# Patient Record
Sex: MALE | Race: OTHER | HISPANIC OR LATINO | URBAN - METROPOLITAN AREA
[De-identification: names, ages, dates, MRNs, and addresses within clinical notes are randomized per-mention and may not be internally consistent; named-entity substitution may affect disease eponyms.]

---

## 2018-01-16 ENCOUNTER — EMERGENCY (EMERGENCY)
Facility: HOSPITAL | Age: 58
LOS: 1 days | Discharge: HOME | End: 2018-01-16
Attending: EMERGENCY MEDICINE
Payer: COMMERCIAL

## 2018-01-16 VITALS
OXYGEN SATURATION: 99 % | WEIGHT: 205.03 LBS | HEIGHT: 64 IN | HEART RATE: 62 BPM | DIASTOLIC BLOOD PRESSURE: 81 MMHG | RESPIRATION RATE: 18 BRPM | TEMPERATURE: 97 F | SYSTOLIC BLOOD PRESSURE: 136 MMHG

## 2018-01-16 PROCEDURE — 99285 EMERGENCY DEPT VISIT HI MDM: CPT | Mod: 25

## 2018-01-17 VITALS
DIASTOLIC BLOOD PRESSURE: 78 MMHG | RESPIRATION RATE: 18 BRPM | SYSTOLIC BLOOD PRESSURE: 132 MMHG | OXYGEN SATURATION: 98 % | HEART RATE: 72 BPM | TEMPERATURE: 98 F

## 2018-01-17 LAB
ALBUMIN SERPL ELPH-MCNC: 3.9 G/DL — SIGNIFICANT CHANGE UP (ref 3.5–5)
ALP SERPL-CCNC: 87 U/L — SIGNIFICANT CHANGE UP (ref 40–120)
ALT FLD-CCNC: 55 U/L DA — SIGNIFICANT CHANGE UP (ref 10–60)
ANION GAP SERPL CALC-SCNC: 8 MMOL/L — SIGNIFICANT CHANGE UP (ref 5–17)
APPEARANCE UR: CLEAR — SIGNIFICANT CHANGE UP
AST SERPL-CCNC: 26 U/L — SIGNIFICANT CHANGE UP (ref 10–40)
BASOPHILS # BLD AUTO: 0.1 K/UL — SIGNIFICANT CHANGE UP (ref 0–0.2)
BASOPHILS NFR BLD AUTO: 0.9 % — SIGNIFICANT CHANGE UP (ref 0–2)
BILIRUB SERPL-MCNC: 0.5 MG/DL — SIGNIFICANT CHANGE UP (ref 0.2–1.2)
BILIRUB UR-MCNC: NEGATIVE — SIGNIFICANT CHANGE UP
BUN SERPL-MCNC: 16 MG/DL — SIGNIFICANT CHANGE UP (ref 7–18)
CALCIUM SERPL-MCNC: 8.5 MG/DL — SIGNIFICANT CHANGE UP (ref 8.4–10.5)
CHLORIDE SERPL-SCNC: 104 MMOL/L — SIGNIFICANT CHANGE UP (ref 96–108)
CO2 SERPL-SCNC: 25 MMOL/L — SIGNIFICANT CHANGE UP (ref 22–31)
COLOR SPEC: YELLOW — SIGNIFICANT CHANGE UP
CREAT SERPL-MCNC: 0.88 MG/DL — SIGNIFICANT CHANGE UP (ref 0.5–1.3)
DIFF PNL FLD: NEGATIVE — SIGNIFICANT CHANGE UP
EOSINOPHIL # BLD AUTO: 0.2 K/UL — SIGNIFICANT CHANGE UP (ref 0–0.5)
EOSINOPHIL NFR BLD AUTO: 2.7 % — SIGNIFICANT CHANGE UP (ref 0–6)
GLUCOSE SERPL-MCNC: 166 MG/DL — HIGH (ref 70–99)
GLUCOSE UR QL: NEGATIVE — SIGNIFICANT CHANGE UP
HCT VFR BLD CALC: 44.1 % — SIGNIFICANT CHANGE UP (ref 39–50)
HGB BLD-MCNC: 14.1 G/DL — SIGNIFICANT CHANGE UP (ref 13–17)
KETONES UR-MCNC: NEGATIVE — SIGNIFICANT CHANGE UP
LEUKOCYTE ESTERASE UR-ACNC: NEGATIVE — SIGNIFICANT CHANGE UP
LYMPHOCYTES # BLD AUTO: 3.7 K/UL — HIGH (ref 1–3.3)
LYMPHOCYTES # BLD AUTO: 46.1 % — HIGH (ref 13–44)
MCHC RBC-ENTMCNC: 28.8 PG — SIGNIFICANT CHANGE UP (ref 27–34)
MCHC RBC-ENTMCNC: 32 GM/DL — SIGNIFICANT CHANGE UP (ref 32–36)
MCV RBC AUTO: 89.8 FL — SIGNIFICANT CHANGE UP (ref 80–100)
MONOCYTES # BLD AUTO: 0.6 K/UL — SIGNIFICANT CHANGE UP (ref 0–0.9)
MONOCYTES NFR BLD AUTO: 7.1 % — SIGNIFICANT CHANGE UP (ref 2–14)
NEUTROPHILS # BLD AUTO: 3.5 K/UL — SIGNIFICANT CHANGE UP (ref 1.8–7.4)
NEUTROPHILS NFR BLD AUTO: 43.3 % — SIGNIFICANT CHANGE UP (ref 43–77)
NITRITE UR-MCNC: NEGATIVE — SIGNIFICANT CHANGE UP
PH UR: 5 — SIGNIFICANT CHANGE UP (ref 5–8)
PLATELET # BLD AUTO: 200 K/UL — SIGNIFICANT CHANGE UP (ref 150–400)
POTASSIUM SERPL-MCNC: 4 MMOL/L — SIGNIFICANT CHANGE UP (ref 3.5–5.3)
POTASSIUM SERPL-SCNC: 4 MMOL/L — SIGNIFICANT CHANGE UP (ref 3.5–5.3)
PROT SERPL-MCNC: 7.6 G/DL — SIGNIFICANT CHANGE UP (ref 6–8.3)
PROT UR-MCNC: NEGATIVE — SIGNIFICANT CHANGE UP
RBC # BLD: 4.91 M/UL — SIGNIFICANT CHANGE UP (ref 4.2–5.8)
RBC # FLD: 12.4 % — SIGNIFICANT CHANGE UP (ref 10.3–14.5)
SODIUM SERPL-SCNC: 137 MMOL/L — SIGNIFICANT CHANGE UP (ref 135–145)
SP GR SPEC: 1.02 — SIGNIFICANT CHANGE UP (ref 1.01–1.02)
UROBILINOGEN FLD QL: NEGATIVE — SIGNIFICANT CHANGE UP
WBC # BLD: 8.1 K/UL — SIGNIFICANT CHANGE UP (ref 3.8–10.5)
WBC # FLD AUTO: 8.1 K/UL — SIGNIFICANT CHANGE UP (ref 3.8–10.5)

## 2018-01-17 PROCEDURE — 87086 URINE CULTURE/COLONY COUNT: CPT

## 2018-01-17 PROCEDURE — 74176 CT ABD & PELVIS W/O CONTRAST: CPT

## 2018-01-17 PROCEDURE — 74176 CT ABD & PELVIS W/O CONTRAST: CPT | Mod: 26

## 2018-01-17 PROCEDURE — 99284 EMERGENCY DEPT VISIT MOD MDM: CPT | Mod: 25

## 2018-01-17 PROCEDURE — 96374 THER/PROPH/DIAG INJ IV PUSH: CPT

## 2018-01-17 PROCEDURE — 81003 URINALYSIS AUTO W/O SCOPE: CPT

## 2018-01-17 PROCEDURE — 85027 COMPLETE CBC AUTOMATED: CPT

## 2018-01-17 PROCEDURE — 96375 TX/PRO/DX INJ NEW DRUG ADDON: CPT

## 2018-01-17 PROCEDURE — 80053 COMPREHEN METABOLIC PANEL: CPT

## 2018-01-17 RX ORDER — SODIUM CHLORIDE 9 MG/ML
3 INJECTION INTRAMUSCULAR; INTRAVENOUS; SUBCUTANEOUS ONCE
Qty: 0 | Refills: 0 | Status: COMPLETED | OUTPATIENT
Start: 2018-01-17 | End: 2018-01-17

## 2018-01-17 RX ORDER — TAMSULOSIN HYDROCHLORIDE 0.4 MG/1
1 CAPSULE ORAL
Qty: 10 | Refills: 0 | OUTPATIENT
Start: 2018-01-17 | End: 2018-01-26

## 2018-01-17 RX ORDER — MORPHINE SULFATE 50 MG/1
2 CAPSULE, EXTENDED RELEASE ORAL ONCE
Qty: 0 | Refills: 0 | Status: DISCONTINUED | OUTPATIENT
Start: 2018-01-17 | End: 2018-01-17

## 2018-01-17 RX ORDER — KETOROLAC TROMETHAMINE 30 MG/ML
30 SYRINGE (ML) INJECTION ONCE
Qty: 0 | Refills: 0 | Status: DISCONTINUED | OUTPATIENT
Start: 2018-01-17 | End: 2018-01-17

## 2018-01-17 RX ORDER — IBUPROFEN 200 MG
1 TABLET ORAL
Qty: 40 | Refills: 0 | OUTPATIENT
Start: 2018-01-17 | End: 2018-01-26

## 2018-01-17 RX ORDER — TAMSULOSIN HYDROCHLORIDE 0.4 MG/1
0.4 CAPSULE ORAL ONCE
Qty: 0 | Refills: 0 | Status: COMPLETED | OUTPATIENT
Start: 2018-01-17 | End: 2018-01-17

## 2018-01-17 RX ADMIN — Medication 30 MILLIGRAM(S): at 03:55

## 2018-01-17 RX ADMIN — TAMSULOSIN HYDROCHLORIDE 0.4 MILLIGRAM(S): 0.4 CAPSULE ORAL at 04:52

## 2018-01-17 RX ADMIN — MORPHINE SULFATE 2 MILLIGRAM(S): 50 CAPSULE, EXTENDED RELEASE ORAL at 04:43

## 2018-01-17 RX ADMIN — Medication 30 MILLIGRAM(S): at 04:43

## 2018-01-17 RX ADMIN — SODIUM CHLORIDE 3 MILLILITER(S): 9 INJECTION INTRAMUSCULAR; INTRAVENOUS; SUBCUTANEOUS at 02:49

## 2018-01-17 RX ADMIN — MORPHINE SULFATE 2 MILLIGRAM(S): 50 CAPSULE, EXTENDED RELEASE ORAL at 03:55

## 2018-01-17 NOTE — ED ADULT NURSE REASSESSMENT NOTE - NS ED NURSE REASSESS COMMENT FT1
Pt. verbalized improvement. No signs of distress noted. No complaints voiced. Son at bedside. Pt. is stable for discharge.

## 2018-01-17 NOTE — ED PROVIDER NOTE - PROGRESS NOTE DETAILS
labs unremarkable, UA neg, CT shows 4mm in L UVJ. Discussed above with patient. Patient stable for discharge with instructions to f/u with urology.

## 2018-01-17 NOTE — ED PROVIDER NOTE - MEDICAL DECISION MAKING DETAILS
Pt with lower back pain radiating to abd x yesterday. Will get labs, UA, CT abd pelvis and reassess. Given morphine for pain.

## 2018-01-17 NOTE — ED PROVIDER NOTE - OBJECTIVE STATEMENT
56 y/o male with PMHx of HTN, DM, renal stones presents to the ED c/o lower back pain x yesterday. Pt notes he first noticed the pain at rest laying down in bed, pain radiates to his idris lower abd. Pt denies  Sx, fever, diarrhea, vomiting, or any other complaints. Pt notes last BM x yesterday. NKDA.

## 2018-01-18 PROBLEM — Z00.00 ENCOUNTER FOR PREVENTIVE HEALTH EXAMINATION: Status: ACTIVE | Noted: 2018-01-18

## 2018-01-18 LAB
CULTURE RESULTS: NO GROWTH — SIGNIFICANT CHANGE UP
SPECIMEN SOURCE: SIGNIFICANT CHANGE UP

## 2018-01-26 ENCOUNTER — APPOINTMENT (OUTPATIENT)
Dept: UROLOGY | Facility: CLINIC | Age: 58
End: 2018-01-26
Payer: COMMERCIAL

## 2018-01-26 VITALS
SYSTOLIC BLOOD PRESSURE: 120 MMHG | HEART RATE: 65 BPM | WEIGHT: 204 LBS | RESPIRATION RATE: 16 BRPM | TEMPERATURE: 97.6 F | DIASTOLIC BLOOD PRESSURE: 72 MMHG

## 2018-01-26 DIAGNOSIS — Z78.9 OTHER SPECIFIED HEALTH STATUS: ICD-10-CM

## 2018-01-26 DIAGNOSIS — Z80.0 FAMILY HISTORY OF MALIGNANT NEOPLASM OF DIGESTIVE ORGANS: ICD-10-CM

## 2018-01-26 DIAGNOSIS — N20.9 URINARY CALCULUS, UNSPECIFIED: ICD-10-CM

## 2018-01-26 DIAGNOSIS — Z86.39 PERSONAL HISTORY OF OTHER ENDOCRINE, NUTRITIONAL AND METABOLIC DISEASE: ICD-10-CM

## 2018-01-26 DIAGNOSIS — N20.1 CALCULUS OF URETER: ICD-10-CM

## 2018-01-26 DIAGNOSIS — Z87.891 PERSONAL HISTORY OF NICOTINE DEPENDENCE: ICD-10-CM

## 2018-01-26 PROCEDURE — 99203 OFFICE O/P NEW LOW 30 MIN: CPT

## 2018-01-29 PROBLEM — Z86.39 HISTORY OF DIABETES MELLITUS: Status: RESOLVED | Noted: 2018-01-26 | Resolved: 2018-01-29

## 2018-01-29 PROBLEM — Z87.891 FORMER SMOKER: Status: ACTIVE | Noted: 2018-01-26

## 2018-01-29 PROBLEM — Z80.0 FAMILY HISTORY OF PANCREATIC CANCER: Status: ACTIVE | Noted: 2018-01-26

## 2018-01-29 PROBLEM — Z78.9 SOCIAL ALCOHOL USE: Status: ACTIVE | Noted: 2018-01-26

## 2018-02-23 ENCOUNTER — APPOINTMENT (OUTPATIENT)
Dept: UROLOGY | Facility: CLINIC | Age: 58
End: 2018-02-23

## 2018-09-07 ENCOUNTER — INPATIENT (INPATIENT)
Facility: HOSPITAL | Age: 58
LOS: 2 days | Discharge: HOME | DRG: 74 | End: 2018-09-10
Attending: INTERNAL MEDICINE | Admitting: INTERNAL MEDICINE
Payer: COMMERCIAL

## 2018-09-07 VITALS
OXYGEN SATURATION: 97 % | SYSTOLIC BLOOD PRESSURE: 162 MMHG | DIASTOLIC BLOOD PRESSURE: 94 MMHG | HEART RATE: 985 BPM | WEIGHT: 210.1 LBS | TEMPERATURE: 98 F | HEIGHT: 65 IN | RESPIRATION RATE: 18 BRPM

## 2018-09-07 DIAGNOSIS — Z29.9 ENCOUNTER FOR PROPHYLACTIC MEASURES, UNSPECIFIED: ICD-10-CM

## 2018-09-07 DIAGNOSIS — I10 ESSENTIAL (PRIMARY) HYPERTENSION: ICD-10-CM

## 2018-09-07 DIAGNOSIS — Z98.890 OTHER SPECIFIED POSTPROCEDURAL STATES: Chronic | ICD-10-CM

## 2018-09-07 DIAGNOSIS — E11.9 TYPE 2 DIABETES MELLITUS WITHOUT COMPLICATIONS: ICD-10-CM

## 2018-09-07 DIAGNOSIS — R29.810 FACIAL WEAKNESS: ICD-10-CM

## 2018-09-07 DIAGNOSIS — E78.5 HYPERLIPIDEMIA, UNSPECIFIED: ICD-10-CM

## 2018-09-07 DIAGNOSIS — M65.332 TRIGGER FINGER, LEFT MIDDLE FINGER: ICD-10-CM

## 2018-09-07 PROBLEM — N20.0 CALCULUS OF KIDNEY: Chronic | Status: ACTIVE | Noted: 2018-01-17

## 2018-09-07 LAB
ALBUMIN SERPL ELPH-MCNC: 4 G/DL — SIGNIFICANT CHANGE UP (ref 3.5–5)
ALP SERPL-CCNC: 103 U/L — SIGNIFICANT CHANGE UP (ref 40–120)
ALT FLD-CCNC: 71 U/L DA — HIGH (ref 10–60)
ANION GAP SERPL CALC-SCNC: 10 MMOL/L — SIGNIFICANT CHANGE UP (ref 5–17)
APTT BLD: 32.9 SEC — SIGNIFICANT CHANGE UP (ref 27.5–37.4)
AST SERPL-CCNC: 32 U/L — SIGNIFICANT CHANGE UP (ref 10–40)
BASOPHILS # BLD AUTO: 0 K/UL — SIGNIFICANT CHANGE UP (ref 0–0.2)
BASOPHILS NFR BLD AUTO: 0.7 % — SIGNIFICANT CHANGE UP (ref 0–2)
BILIRUB SERPL-MCNC: 0.5 MG/DL — SIGNIFICANT CHANGE UP (ref 0.2–1.2)
BUN SERPL-MCNC: 12 MG/DL — SIGNIFICANT CHANGE UP (ref 7–18)
CALCIUM SERPL-MCNC: 9.3 MG/DL — SIGNIFICANT CHANGE UP (ref 8.4–10.5)
CHLORIDE SERPL-SCNC: 106 MMOL/L — SIGNIFICANT CHANGE UP (ref 96–108)
CO2 SERPL-SCNC: 24 MMOL/L — SIGNIFICANT CHANGE UP (ref 22–31)
CREAT SERPL-MCNC: 1.06 MG/DL — SIGNIFICANT CHANGE UP (ref 0.5–1.3)
EOSINOPHIL # BLD AUTO: 0.2 K/UL — SIGNIFICANT CHANGE UP (ref 0–0.5)
EOSINOPHIL NFR BLD AUTO: 2.5 % — SIGNIFICANT CHANGE UP (ref 0–6)
GLUCOSE SERPL-MCNC: 85 MG/DL — SIGNIFICANT CHANGE UP (ref 70–99)
HBA1C BLD-MCNC: 7.8 % — HIGH (ref 4–5.6)
HCT VFR BLD CALC: 41.6 % — SIGNIFICANT CHANGE UP (ref 39–50)
HGB BLD-MCNC: 13.7 G/DL — SIGNIFICANT CHANGE UP (ref 13–17)
INR BLD: 1.07 RATIO — SIGNIFICANT CHANGE UP (ref 0.88–1.16)
LYMPHOCYTES # BLD AUTO: 2.1 K/UL — SIGNIFICANT CHANGE UP (ref 1–3.3)
LYMPHOCYTES # BLD AUTO: 28.1 % — SIGNIFICANT CHANGE UP (ref 13–44)
MCHC RBC-ENTMCNC: 29.4 PG — SIGNIFICANT CHANGE UP (ref 27–34)
MCHC RBC-ENTMCNC: 33 GM/DL — SIGNIFICANT CHANGE UP (ref 32–36)
MCV RBC AUTO: 89 FL — SIGNIFICANT CHANGE UP (ref 80–100)
MONOCYTES # BLD AUTO: 0.6 K/UL — SIGNIFICANT CHANGE UP (ref 0–0.9)
MONOCYTES NFR BLD AUTO: 8.7 % — SIGNIFICANT CHANGE UP (ref 2–14)
NEUTROPHILS # BLD AUTO: 4.4 K/UL — SIGNIFICANT CHANGE UP (ref 1.8–7.4)
NEUTROPHILS NFR BLD AUTO: 60.1 % — SIGNIFICANT CHANGE UP (ref 43–77)
PLATELET # BLD AUTO: 223 K/UL — SIGNIFICANT CHANGE UP (ref 150–400)
POTASSIUM SERPL-MCNC: 4.1 MMOL/L — SIGNIFICANT CHANGE UP (ref 3.5–5.3)
POTASSIUM SERPL-SCNC: 4.1 MMOL/L — SIGNIFICANT CHANGE UP (ref 3.5–5.3)
PROT SERPL-MCNC: 7.9 G/DL — SIGNIFICANT CHANGE UP (ref 6–8.3)
PROTHROM AB SERPL-ACNC: 11.7 SEC — SIGNIFICANT CHANGE UP (ref 9.8–12.7)
RBC # BLD: 4.67 M/UL — SIGNIFICANT CHANGE UP (ref 4.2–5.8)
RBC # FLD: 12.6 % — SIGNIFICANT CHANGE UP (ref 10.3–14.5)
SODIUM SERPL-SCNC: 140 MMOL/L — SIGNIFICANT CHANGE UP (ref 135–145)
TROPONIN I SERPL-MCNC: <0.015 NG/ML — SIGNIFICANT CHANGE UP (ref 0–0.04)
WBC # BLD: 7.3 K/UL — SIGNIFICANT CHANGE UP (ref 3.8–10.5)
WBC # FLD AUTO: 7.3 K/UL — SIGNIFICANT CHANGE UP (ref 3.8–10.5)

## 2018-09-07 PROCEDURE — 70498 CT ANGIOGRAPHY NECK: CPT | Mod: 26

## 2018-09-07 PROCEDURE — 99285 EMERGENCY DEPT VISIT HI MDM: CPT

## 2018-09-07 PROCEDURE — 70496 CT ANGIOGRAPHY HEAD: CPT | Mod: 26

## 2018-09-07 RX ORDER — DICLOFENAC SODIUM 75 MG/1
1 TABLET, DELAYED RELEASE ORAL
Qty: 0 | Refills: 0 | COMMUNITY

## 2018-09-07 RX ORDER — ATORVASTATIN CALCIUM 80 MG/1
20 TABLET, FILM COATED ORAL AT BEDTIME
Qty: 0 | Refills: 0 | Status: DISCONTINUED | OUTPATIENT
Start: 2018-09-07 | End: 2018-09-07

## 2018-09-07 RX ORDER — ASPIRIN/CALCIUM CARB/MAGNESIUM 324 MG
81 TABLET ORAL DAILY
Qty: 0 | Refills: 0 | Status: DISCONTINUED | OUTPATIENT
Start: 2018-09-07 | End: 2018-09-10

## 2018-09-07 RX ORDER — CLOPIDOGREL BISULFATE 75 MG/1
75 TABLET, FILM COATED ORAL DAILY
Qty: 0 | Refills: 0 | Status: DISCONTINUED | OUTPATIENT
Start: 2018-09-07 | End: 2018-09-10

## 2018-09-07 RX ORDER — MELOXICAM 15 MG/1
1 TABLET ORAL
Qty: 0 | Refills: 0 | COMMUNITY

## 2018-09-07 RX ORDER — INSULIN LISPRO 100/ML
VIAL (ML) SUBCUTANEOUS
Qty: 0 | Refills: 0 | Status: DISCONTINUED | OUTPATIENT
Start: 2018-09-07 | End: 2018-09-10

## 2018-09-07 RX ORDER — PANTOPRAZOLE SODIUM 20 MG/1
1 TABLET, DELAYED RELEASE ORAL
Qty: 0 | Refills: 0 | COMMUNITY

## 2018-09-07 RX ORDER — DICLOFENAC SODIUM 75 MG/1
100 TABLET, DELAYED RELEASE ORAL DAILY
Qty: 0 | Refills: 0 | Status: DISCONTINUED | OUTPATIENT
Start: 2018-09-07 | End: 2018-09-07

## 2018-09-07 RX ORDER — ASPIRIN/CALCIUM CARB/MAGNESIUM 324 MG
325 TABLET ORAL ONCE
Qty: 0 | Refills: 0 | Status: COMPLETED | OUTPATIENT
Start: 2018-09-07 | End: 2018-09-07

## 2018-09-07 RX ORDER — REPAGLINIDE 1 MG/1
2 TABLET ORAL
Qty: 0 | Refills: 0 | COMMUNITY

## 2018-09-07 RX ORDER — ENOXAPARIN SODIUM 100 MG/ML
40 INJECTION SUBCUTANEOUS DAILY
Qty: 0 | Refills: 0 | Status: DISCONTINUED | OUTPATIENT
Start: 2018-09-07 | End: 2018-09-10

## 2018-09-07 RX ORDER — CELECOXIB 200 MG/1
200 CAPSULE ORAL DAILY
Qty: 0 | Refills: 0 | Status: DISCONTINUED | OUTPATIENT
Start: 2018-09-07 | End: 2018-09-07

## 2018-09-07 RX ORDER — METFORMIN HYDROCHLORIDE 850 MG/1
1 TABLET ORAL
Qty: 0 | Refills: 0 | COMMUNITY

## 2018-09-07 RX ORDER — LISINOPRIL 2.5 MG/1
1 TABLET ORAL
Qty: 0 | Refills: 0 | COMMUNITY

## 2018-09-07 RX ORDER — PANTOPRAZOLE SODIUM 20 MG/1
40 TABLET, DELAYED RELEASE ORAL
Qty: 0 | Refills: 0 | Status: DISCONTINUED | OUTPATIENT
Start: 2018-09-07 | End: 2018-09-10

## 2018-09-07 RX ORDER — ATORVASTATIN CALCIUM 80 MG/1
40 TABLET, FILM COATED ORAL AT BEDTIME
Qty: 0 | Refills: 0 | Status: DISCONTINUED | OUTPATIENT
Start: 2018-09-07 | End: 2018-09-10

## 2018-09-07 RX ADMIN — ATORVASTATIN CALCIUM 40 MILLIGRAM(S): 80 TABLET, FILM COATED ORAL at 22:50

## 2018-09-07 RX ADMIN — Medication 325 MILLIGRAM(S): at 15:18

## 2018-09-07 NOTE — H&P ADULT - FAMILY HISTORY
Father  Still living? Unknown  Family history of pancreatic cancer, Age at diagnosis: Age Unknown     Grandparent  Still living? Unknown  Family history of stroke, Age at diagnosis: Age Unknown

## 2018-09-07 NOTE — ED PROVIDER NOTE - OBJECTIVE STATEMENT
58 male htn, hld, dm, R facial numbness this morning. no clear onset but was going on since at least 745 am, noticed that he was drooling a bit from his r side and taste was off. no weakness to arms or legs. no prob to speak. never happened before, no cp or sob.   telephone was utilized to facilitate history / physical / discussion. 58 male htn, hld, dm, R facial numbness this morning. no clear onset but was going on since at least 745 am, noticed that he was drooling a bit from his r side and taste was off. no weakness to arms or legs. no prob to speak. never happened before, no cp or sob. Notes that he generally feels drowsy and eyelid droop.   telephone was utilized to facilitate history / physical / discussion.

## 2018-09-07 NOTE — ED PROVIDER NOTE - MEDICAL DECISION MAKING DETAILS
unclear last nl - appears to be outside tpa window. cva vs bells palsy - favors stroke based on symptoms / co-morbid. ct/cta. no code stroke called due to time outside tpa window. If 745 was certain pt would be <4.5 hrs but more than 3.

## 2018-09-07 NOTE — H&P ADULT - REASON FOR ADMISSION
Rt. Facial numbness Numbness over Rt. side of mouth, drooling of water from Lt. side of mouth, difficulty to close eye

## 2018-09-07 NOTE — H&P ADULT - NEGATIVE OPHTHALMOLOGIC SYMPTOMS
no photophobia/no discharge L/no lacrimation L/no discharge R/no diplopia/no blurred vision L/no blurred vision R/no lacrimation R

## 2018-09-07 NOTE — ED PROVIDER NOTE - PROGRESS NOTE DETAILS
d/w pt concern for stroke but his minor symptoms. I advised him the risk of tpa brandi if we are not certain of the timeline. he understands the risk and does not want tpa as he is not 100% regarding time. china herrera near complete resolution, will admit for eval tia/cva d/w dr queen who will inform dr arreola

## 2018-09-07 NOTE — H&P ADULT - NEGATIVE ENMT SYMPTOMS
no post-nasal discharge/no dysphagia/no hearing difficulty/no tinnitus/no vertigo/no abnormal taste sensation/no ear pain

## 2018-09-07 NOTE — ED PROVIDER NOTE - ENMT, MLM
Airway patent, Nasal mucosa clear. Mouth with normal mucosa. Throat has no vesicles, no oropharyngeal exudates and uvula is midline. symptoms appear to spare R forehead

## 2018-09-07 NOTE — H&P ADULT - PROBLEM SELECTOR PLAN 1
- p/w numbness over Rt. side of mouth and drooling of water from lt. side while brushing with numbness and weakness of rt. thigh and B/L UE.  - NIHSS 1  - risk factor- HLD, HTN, DM, former smoker  - CT angio neck and head normal  - EKG shows   - trop X 1 negative  - on asa, plavix and statin.  - on tele  - FU ECHO, HbA1C, lipid profile, TSH, Vitamin B12, folic acid. - p/w numbness over Rt. side of mouth and drooling of water from lt. side while brushing with numbness and weakness of rt. thigh and B/L UE.  - NIHSS 1, minimal deviation of mouth to Rt. side  - risk factor- HLD, HTN, DM, former smoker  - CT angio neck and head normal  - EKG shows   - trop X 1 negative  - on asa, plavix and statin.  - on tele  - on mechanical soft diet  - PT and speech and swallow consulted  - FU ECHO, HbA1C, lipid profile, TSH, Vitamin B12, folic acid, - p/w numbness over Rt. side of mouth and drooling of water from lt. side while brushing with numbness and weakness of rt. thigh and B/L UE.  - NIHSS 1, minimal deviation of mouth to Rt. side  - risk factor- HLD, HTN, DM, former smoker  - CT angio neck and head normal  - EKG shows   - trop X 1 negative  - on asa, plavix and statin.  - on tele  - on mechanical soft diet  - PT and speech and swallow consulted  - FU ECHO, HbA1C, lipid profile, TSH, Vitamin B12, folic acid  -Neuro Dr. srinivasan   - Cardio Dr. Major  FU MRI - p/w numbness over Rt. side of mouth and drooling of water from lt. side while brushing with numbness and weakness of rt. thigh and B/L UE.  - NIHSS 1, minimal deviation of mouth to Rt. side  - risk factor- HLD, HTN, DM, former smoker  - CT angio neck and head normal  - EKG shows NSR @ 74bpm  - trop X 1 negative  - on asa, plavix and statin.  - on tele  - on mechanical soft diet  - PT and speech and swallow consulted  - FU ECHO, HbA1C, lipid profile, TSH, Vitamin B12, folic acid  -Neuro Dr. Jerez  - Cardio Dr. Major  - FU MRI

## 2018-09-07 NOTE — H&P ADULT - PROBLEM SELECTOR PLAN 4
- on metformin 1000mg bid and repaglinide 2mg bid at home  - started on hss  - monitor BS  - FU HbA1C

## 2018-09-07 NOTE — ED ADULT NURSE NOTE - OBJECTIVE STATEMENT
pt is here for facial numbness.  pt stated that it was in the morning, right side of face numb, eyes keep closing, feel sleeping, denied pain or sob, denied chest pain or headache, pt calm at this time.

## 2018-09-07 NOTE — H&P ADULT - PROBLEM SELECTOR PLAN 7
IMPROVE VTE Individual Risk Assessment    RISK                                                                Points    [  ] Previous VTE                                                  3  [  ] Thrombophilia                                               2  [  ] Lower limb paralysis                                      2        (unable to hold up >15 seconds)    [  ] Current Cancer                                              2         (within 6 months)  [ x ] Immobilization > 24 hrs                                1  [  ] ICU/CCU stay > 24 hours                              1  [  ] Age > 60                                                      1    IMPROVE VTE Score __1_______  No indication for DVT PPx.

## 2018-09-07 NOTE — ED ADULT NURSE NOTE - NSIMPLEMENTINTERV_GEN_ALL_ED
Implemented All Universal Safety Interventions:  Wever to call system. Call bell, personal items and telephone within reach. Instruct patient to call for assistance. Room bathroom lighting operational. Non-slip footwear when patient is off stretcher. Physically safe environment: no spills, clutter or unnecessary equipment. Stretcher in lowest position, wheels locked, appropriate side rails in place.

## 2018-09-07 NOTE — H&P ADULT - NSHPSOCIALHISTORY_GEN_ALL_CORE
Drinks alcohol on weekendsl, former smoker left 27 yrs ago and denies recreational drugs. Lives alone, works as a [employer]azquez.

## 2018-09-07 NOTE — H&P ADULT - PROBLEM SELECTOR PLAN 2
- complaints of difficulty breathing while sleeping  - monitor SO2  - will benefit from outpt. sleep study

## 2018-09-07 NOTE — H&P ADULT - HISTORY OF PRESENT ILLNESS
58M from home, with PMHx of HTN, DM, HLD presented to ED c/o       ED course: Vitals: BP:  HR:  RR:  T:  SaO2:  Labs significant for  Radiological findings  Antimicrobial [] IVF[]  GOC: 58M from home, with PMHx of HTN, DM, HLD presented to ED c/o numbness over Rt. side of mouth. He states he has been having numbness and weakness over Rt. thigh since 2 weeks, and both UE since 3 weeks. He saw his PCP for the symptom and was given pain medication and asked to do some imaging but he has not done any. He also complaints of neck pain since 3 days, more on Lt. side, non radiating, 5/10, poking like pain, decreased on bending his neck to lt. side, no aggravating factor. Today morning, after waking up, he felt numbness over       ED course: Vitals: BP:  HR:  RR:  T:  SaO2:  Labs significant for  Radiological findings  Antimicrobial [] IVF[]  GOC: 57 yo Polish male from home, with PMHx of HTN, DM, HLD, lt. middle trigger finger  presented to ED c/o numbness over Rt. side of mouth. He states he has been having numbness and weakness over Rt. thigh since 2 weeks, and both UE since 3 weeks. He saw his PCP for the symptom and was given pain medication and asked to do some imaging but he has not done any. He also complaints of neck pain since 3 days, more on Lt. side, non radiating, 5/10, poking like pain, decreased on bending his neck to lt. side, no aggravating factor. Today morning, after waking up, he felt numbness over Rt. side of his mouth, had drooling of water when he was brushing from lt. side and had difficulty closing his Rt. eye. He also complaints of difficulty breathing when he sleeps sometimes. He had sleep study done long time ago which was normal.      ED course: Vitals: BP: 151/77  HR: 66  RR: 18  T: 98.2  SaO2: 93  Labs normal   Radiological findings- CT angio neck and head normal  EKG- 57 yo [place] male from home, with PMHx of HTN, DM, HLD, lt. middle trigger finger  presented to ED c/o numbness over Rt. side of mouth. He states he has been having numbness and weakness over Rt. thigh since 2 weeks, and both UE since 3 weeks. He saw his PCP for the symptom and was given pain medication and asked to do some imaging but he has not done any. He also complaints of neck pain since 3 days, more on Lt. side, non radiating, 5/10, poking like pain, decreased on bending his neck to lt. side, no aggravating factor. Today morning, after waking up, he felt numbness over Rt. side of his mouth, had drooling of water when he was brushing from lt. side and had difficulty closing his Rt. eye. He also complaints of difficulty breathing when he sleeps sometimes. He had sleep study done long time ago which was normal.      ED course: Vitals: BP: 151/77  HR: 66  RR: 18  T: 98.2  SaO2: 93  Labs normal   Radiological findings- CT angio neck and head normal  EKG- NSR @ 74bpm

## 2018-09-08 LAB
ANION GAP SERPL CALC-SCNC: 8 MMOL/L — SIGNIFICANT CHANGE UP (ref 5–17)
BASOPHILS # BLD AUTO: 0.1 K/UL — SIGNIFICANT CHANGE UP (ref 0–0.2)
BASOPHILS NFR BLD AUTO: 0.7 % — SIGNIFICANT CHANGE UP (ref 0–2)
BUN SERPL-MCNC: 14 MG/DL — SIGNIFICANT CHANGE UP (ref 7–18)
CALCIUM SERPL-MCNC: 9 MG/DL — SIGNIFICANT CHANGE UP (ref 8.4–10.5)
CHLORIDE SERPL-SCNC: 101 MMOL/L — SIGNIFICANT CHANGE UP (ref 96–108)
CHOLEST SERPL-MCNC: 147 MG/DL — SIGNIFICANT CHANGE UP (ref 10–199)
CO2 SERPL-SCNC: 27 MMOL/L — SIGNIFICANT CHANGE UP (ref 22–31)
CREAT SERPL-MCNC: 0.96 MG/DL — SIGNIFICANT CHANGE UP (ref 0.5–1.3)
EOSINOPHIL # BLD AUTO: 0.2 K/UL — SIGNIFICANT CHANGE UP (ref 0–0.5)
EOSINOPHIL NFR BLD AUTO: 2.6 % — SIGNIFICANT CHANGE UP (ref 0–6)
FOLATE SERPL-MCNC: 18.2 NG/ML — SIGNIFICANT CHANGE UP
GLUCOSE BLDC GLUCOMTR-MCNC: 115 MG/DL — HIGH (ref 70–99)
GLUCOSE BLDC GLUCOMTR-MCNC: 130 MG/DL — HIGH (ref 70–99)
GLUCOSE BLDC GLUCOMTR-MCNC: 136 MG/DL — HIGH (ref 70–99)
GLUCOSE BLDC GLUCOMTR-MCNC: 185 MG/DL — HIGH (ref 70–99)
GLUCOSE SERPL-MCNC: 141 MG/DL — HIGH (ref 70–99)
HBA1C BLD-MCNC: 7.9 % — HIGH (ref 4–5.6)
HCT VFR BLD CALC: 44.2 % — SIGNIFICANT CHANGE UP (ref 39–50)
HDLC SERPL-MCNC: 43 MG/DL — SIGNIFICANT CHANGE UP
HGB BLD-MCNC: 14.4 G/DL — SIGNIFICANT CHANGE UP (ref 13–17)
LIPID PNL WITH DIRECT LDL SERPL: 78 MG/DL — SIGNIFICANT CHANGE UP
LYMPHOCYTES # BLD AUTO: 2.8 K/UL — SIGNIFICANT CHANGE UP (ref 1–3.3)
LYMPHOCYTES # BLD AUTO: 33.7 % — SIGNIFICANT CHANGE UP (ref 13–44)
MCHC RBC-ENTMCNC: 29 PG — SIGNIFICANT CHANGE UP (ref 27–34)
MCHC RBC-ENTMCNC: 32.6 GM/DL — SIGNIFICANT CHANGE UP (ref 32–36)
MCV RBC AUTO: 88.9 FL — SIGNIFICANT CHANGE UP (ref 80–100)
MONOCYTES # BLD AUTO: 0.7 K/UL — SIGNIFICANT CHANGE UP (ref 0–0.9)
MONOCYTES NFR BLD AUTO: 8.8 % — SIGNIFICANT CHANGE UP (ref 2–14)
NEUTROPHILS # BLD AUTO: 4.4 K/UL — SIGNIFICANT CHANGE UP (ref 1.8–7.4)
NEUTROPHILS NFR BLD AUTO: 54.2 % — SIGNIFICANT CHANGE UP (ref 43–77)
PLATELET # BLD AUTO: 222 K/UL — SIGNIFICANT CHANGE UP (ref 150–400)
POTASSIUM SERPL-MCNC: 4.1 MMOL/L — SIGNIFICANT CHANGE UP (ref 3.5–5.3)
POTASSIUM SERPL-SCNC: 4.1 MMOL/L — SIGNIFICANT CHANGE UP (ref 3.5–5.3)
RBC # BLD: 4.97 M/UL — SIGNIFICANT CHANGE UP (ref 4.2–5.8)
RBC # FLD: 12.7 % — SIGNIFICANT CHANGE UP (ref 10.3–14.5)
SODIUM SERPL-SCNC: 136 MMOL/L — SIGNIFICANT CHANGE UP (ref 135–145)
TOTAL CHOLESTEROL/HDL RATIO MEASUREMENT: 3.4 RATIO — SIGNIFICANT CHANGE UP (ref 3.4–9.6)
TRIGL SERPL-MCNC: 128 MG/DL — SIGNIFICANT CHANGE UP (ref 10–149)
TSH SERPL-MCNC: 3.1 UU/ML — SIGNIFICANT CHANGE UP (ref 0.34–4.82)
VIT B12 SERPL-MCNC: 709 PG/ML — SIGNIFICANT CHANGE UP (ref 232–1245)
WBC # BLD: 8.2 K/UL — SIGNIFICANT CHANGE UP (ref 3.8–10.5)
WBC # FLD AUTO: 8.2 K/UL — SIGNIFICANT CHANGE UP (ref 3.8–10.5)

## 2018-09-08 RX ORDER — INFLUENZA VIRUS VACCINE 15; 15; 15; 15 UG/.5ML; UG/.5ML; UG/.5ML; UG/.5ML
0.5 SUSPENSION INTRAMUSCULAR ONCE
Qty: 0 | Refills: 0 | Status: DISCONTINUED | OUTPATIENT
Start: 2018-09-08 | End: 2018-09-10

## 2018-09-08 RX ADMIN — Medication 1: at 11:49

## 2018-09-08 RX ADMIN — PANTOPRAZOLE SODIUM 40 MILLIGRAM(S): 20 TABLET, DELAYED RELEASE ORAL at 05:42

## 2018-09-08 RX ADMIN — ATORVASTATIN CALCIUM 40 MILLIGRAM(S): 80 TABLET, FILM COATED ORAL at 21:41

## 2018-09-08 RX ADMIN — CLOPIDOGREL BISULFATE 75 MILLIGRAM(S): 75 TABLET, FILM COATED ORAL at 11:51

## 2018-09-08 RX ADMIN — Medication 81 MILLIGRAM(S): at 11:50

## 2018-09-08 RX ADMIN — ENOXAPARIN SODIUM 40 MILLIGRAM(S): 100 INJECTION SUBCUTANEOUS at 14:21

## 2018-09-08 NOTE — CONSULT NOTE ADULT - SUBJECTIVE AND OBJECTIVE BOX
CHIEF COMPLAINT:Patient is a 58y old  Male who presents with a chief complaint of Numbness over Rt. side of mouth, drooling of water from Lt. side of mouth, difficulty to close eye .      HPI:  58 yr old Japanese male from home, with PMHx of HTN, DM, HLD, lt. middle trigger finger  presented to ED c/o numbness over Rt. side of mouth. He states he has been having numbness and weakness over Rt. thigh since 2 weeks, and both UE since 3 weeks. He saw his PCP for the symptom and was given pain medication and asked to do some imaging but he has not done any. He also complaints of neck pain since 3 days, more on Lt. side, non radiating, 5/10, poking like pain, decreased on bending his neck to lt. side, no aggravating factor. Today morning, after waking up, he felt numbness over Rt. side of his mouth, had drooling of water when he was brushing from lt. side and had difficulty closing his Rt. eye. He also complaints of difficulty breathing when he sleeps sometimes. He had sleep study done long time ago which was normal.          PAST MEDICAL & SURGICAL HISTORY:  Hyperlipidemia  HTN (hypertension)  DM (diabetes mellitus)  Renal stones  H/O hernia repair      MEDICATIONS  (STANDING):  aspirin  chewable 81 milliGRAM(s) Oral daily  atorvastatin 40 milliGRAM(s) Oral at bedtime  clopidogrel Tablet 75 milliGRAM(s) Oral daily  enoxaparin Injectable 40 milliGRAM(s) SubCutaneous daily  influenza   Vaccine 0.5 milliLiter(s) IntraMuscular once  insulin lispro (HumaLOG) corrective regimen sliding scale   SubCutaneous three times a day before meals  pantoprazole    Tablet 40 milliGRAM(s) Oral before breakfast    MEDICATIONS  (PRN):      FAMILY HISTORY:  Family history of stroke (Grandparent)  Family history of pancreatic cancer (Father)      SOCIAL HISTORY:    [x ] Non-smoker    [x ] Alcohol-denies    Allergies    No Known Allergies    Intolerances    	    REVIEW OF SYSTEMS:  CONSTITUTIONAL: No fever, weight loss, or fatigue  EYES: No eye pain, visual disturbances, or discharge  ENT:  No difficulty hearing, tinnitus, vertigo; No sinus or throat pain  NECK: No pain or stiffness  RESPIRATORY: No cough, wheezing, chills or hemoptysis; No Shortness of Breath  CARDIOVASCULAR: No chest pain, palpitations, passing out, dizziness, or leg swelling  GASTROINTESTINAL: No abdominal or epigastric pain. No nausea, vomiting, or hematemesis; No diarrhea or constipation. No melena or hematochezia.  GENITOURINARY: No dysuria, frequency, hematuria, or incontinence  NEUROLOGICAL: No headaches, memory loss, loss of strength, + numbness  SKIN: No itching, burning, rashes, or lesions   LYMPH Nodes: No enlarged glands  ENDOCRINE: No heat or cold intolerance; No hair loss  MUSCULOSKELETAL: No joint pain or swelling; No muscle, back, or extremity pain  PSYCHIATRIC: No depression, anxiety, mood swings, or difficulty sleeping  HEME/LYMPH: No easy bruising, or bleeding gums  ALLERGY AND IMMUNOLOGIC: No hives or eczema	    PHYSICAL EXAM:  T(C): 37 (09-08-18 @ 07:37), Max: 37 (09-08-18 @ 07:37)  HR: 61 (09-08-18 @ 07:37) (60 - 985)  BP: 130/65 (09-08-18 @ 07:37) (126/73 - 162/94)  RR: 18 (09-08-18 @ 07:37) (18 - 18)  SpO2: 98% (09-08-18 @ 07:37) (93% - 99%)  Wt(kg): --  I&O's Summary    08 Sep 2018 07:01  -  08 Sep 2018 09:45  --------------------------------------------------------  IN: 200 mL / OUT: 0 mL / NET: 200 mL        Appearance: Normal	  HEENT:   Normal oral mucosa, PERRL, EOMI	  Lymphatic: No lymphadenopathy  Cardiovascular: Normal S1 S2, No JVD, No murmurs, No edema  Respiratory: Lungs clear to auscultation	  Psychiatry: A & O x 3, Mood & affect appropriate  Gastrointestinal:  Soft, Non-tender, + BS	  Skin: No rashes, No ecchymoses, No cyanosis	  Neurologic: Non-focal  Extremities: Normal range of motion, No clubbing, cyanosis or edema  Vascular: Peripheral pulses palpable 2+ bilaterally        ECG:  	nsr,nl axis    	  LABS:	 	    CARDIAC MARKERS:  CARDIAC MARKERS ( 07 Sep 2018 12:16 )  <0.015 ng/mL / x     / x     / x     / x                             14.4   8.2   )-----------( 222      ( 08 Sep 2018 08:51 )             44.2     09-08    136  |  101  |  14  ----------------------------<  141<H>  4.1   |  27  |  0.96    Ca    9.0      08 Sep 2018 08:51    TPro  7.9  /  Alb  4.0  /  TBili  0.5  /  DBili  x   /  AST  32  /  ALT  71<H>  /  AlkPhos  103  09-07      Lipid Profile: Cholesterol 147  LDL 78  HDL 43      HgA1c: Hemoglobin A1C, Whole Blood: 7.8 % (09-07 @ 22:57)    TSH: Thyroid Stimulating Hormone, Serum: 3.10 uU/mL (09-08 @ 08:51)      EXAM:  CT ANGIO BRAIN (W)AW IC                          EXAM:  CT ANGIO NECK (W)AW IC                            PROCEDURE DATE:  09/07/2018          INTERPRETATION:  CT ANGIOGRAPHY OF THE HEAD AND NECK  NONCONTRAST CT HEAD    HISTORY: R facial parasthesia.    COMPARISON: None available.    TECHNIQUE:   1. Noncontrast CT head was performed.  2. CT angiographic evaluation of the head and neck was performed   consisting of contiguous axial sections scanned from the vertex to the   level of the aortic arch following the intravenous administration of   iodinated contrast. The acquired data was post-processed to generate   3D/MIP coronal, and sagittal reformats of the head and neck arterial   vasculature. Vascular stenosis is measured by NASCET criteria comparing   the narrowest segment with the distal luminal diameter as related to the   reported measure of arterial narrowing.    FINDINGS:    CT head:  No acute intracranial hemorrhage, mass effect or midline shift.  No CT evidence of acute large territory vascular infarct.  The ventricles and cortical sulci are within normal limits.    The paranasal sinuses and mastoid air cells are well aerated.    CTA head and neck:  Bovine arch configuration. Great vessel origins are patent. Innominate   and visualized subclavian arteries are patent.    Anterior circulation:  The common carotid arteries are patent and normal in caliber. The carotid   bifurcations are unremarkable. There is a medial, retropharyngeal course   of the right cervical internal carotid artery. The internal carotid   arteries are otherwise patent and normal in caliber.     The anterior and middle cerebral arteries show normal caliber and   branching pattern.    The left posterior communicating artery is seen.    Posterior circulation:   The vertebral arteries are normal in course and caliber. The basilar   artery is unremarkable. The posterior cerebral arteries are normal in   caliber and arise from the basilar tip.     Other:  No enlarged cervical adenopathy by size criteria.    The visualized lung apices are clear.    Multilevel degenerative changes of the cervical spine are noted, worst at   C5-C6 and C6-C7.    IMPRESSION:  Patent cervical and intracranial vessels without evidence of abrupt   vessel cut off, hemodynamically significant stenosis, aneurysm, or   dissection.    Noncontrast CT head demonstrates no acute intracranial hemorrhage.

## 2018-09-08 NOTE — CONSULT NOTE ADULT - ASSESSMENT
58 yr old British male from home, with PMHx of HTN, DM, HLD, lt. middle trigger finger  presented to ED c/o numbness over Rt. side of mouth. He states he has been having numbness and weakness over Rt. thigh since 2 weeks, and both UE since 3 weeks.   1.Tele monitoring.  2.Echocardiogram.  3.Neurology eval.  4.DM-Insulin  5.HTN-hold bp medication  6.Lipid D/O-statin.  7.GI and DVT prophylaxis.

## 2018-09-08 NOTE — CONSULT NOTE ADULT - REASON FOR ADMISSION
Numbness over Rt. side of mouth, drooling of water from Lt. side of mouth, difficulty to close eye
Numbness over Rt. side of mouth, drooling of water from Lt. side of mouth, difficulty to close eye

## 2018-09-08 NOTE — CONSULT NOTE ADULT - SUBJECTIVE AND OBJECTIVE BOX
Neurology consult    ANTELMO RECINOS58yMale    HPI:  57 yo [place] male from home, with PMHx of HTN, DM, HLD, lt. middle trigger finger  presented to ED c/o numbness over Rt. side of mouth. He states he has been having numbness and weakness over Rt. thigh since 2 weeks, and both UE since 3 weeks. He saw his PCP for the symptom and was given pain medication and asked to do some imaging but he has not done any. He also complaints of neck pain since 3 days, more on Lt. side, non radiating, 5/10, poking like pain, decreased on bending his neck to lt. side, no aggravating factor. Today morning, after waking up, he felt numbness over Rt. side of his mouth, had drooling of water when he was brushing from lt. side and had difficulty closing his Left  eye. He also complaints of difficulty breathing when he sleeps sometimes. He had sleep study done long time ago which was normal.  Numbness of the right thigh .  He has diabetes X 8 years .denies of any weight gain       ED course: Vitals: BP: 151/77  HR: 66  RR: 18  T: 98.2  SaO2: 93  Labs normal   Radiological findings- CT angio neck and head normal  EKG- NSR @ 74bpm (07 Sep 2018 18:48)          MEDICATIONS    aspirin  chewable 81 milliGRAM(s) Oral daily  atorvastatin 40 milliGRAM(s) Oral at bedtime  clopidogrel Tablet 75 milliGRAM(s) Oral daily  enoxaparin Injectable 40 milliGRAM(s) SubCutaneous daily  influenza   Vaccine 0.5 milliLiter(s) IntraMuscular once  insulin lispro (HumaLOG) corrective regimen sliding scale   SubCutaneous three times a day before meals  pantoprazole    Tablet 40 milliGRAM(s) Oral before breakfast         Family history: No history of dementia, strokes, or seizures   FAMILY HISTORY:  Family history of stroke (Grandparent)  Family history of pancreatic cancer (Father)    SOCIAL HISTORY -- No history of tobacco ,He drinks alcohol     Allergies    No Known Allergies    Intolerances            Vital Signs Last 24 Hrs  T(C): 37.1 (08 Sep 2018 11:05), Max: 37.1 (08 Sep 2018 11:05)  T(F): 98.7 (08 Sep 2018 11:05), Max: 98.7 (08 Sep 2018 11:05)  HR: 68 (08 Sep 2018 11:41) (60 - 76)  BP: 125/70 (08 Sep 2018 11:41) (125/70 - 151/77)  BP(mean): --  RR: 18 (08 Sep 2018 11:05) (18 - 18)  SpO2: 98% (08 Sep 2018 11:41) (93% - 99%)      REVIEW OF SYSTEMS:    Constitutional: No fever, chills, fatigue, weakness  Eyes: no eye pain, visual disturbances, or discharge  ENT:  No difficulty hearing, tinnitus, vertigo; No sinus or throat pain  Neck: + pain or stiffness  Respiratory: No cough, dyspnea, wheezing   Cardiovascular: No chest pain, palpitations,   Gastrointestinal: No abdominal or epigastric pain. No nausea, vomiting  No diarrhea or constipation.   Genitourinary: No dysuria, frequency, hematuria or incontinence  Neurological: No headaches, lightheadedness, vertigo, numbness or tremors  Psychiatric: No depression, anxiety, mood swings or difficulty sleeping  Musculoskeletal: Right Thigh  extremity pain  Skin: No itching, burning, rashes or lesions   Lymph Nodes: No enlarged glands  Endocrine: No heat or cold intolerance; No hair loss, + diabetes   Allergy and Immunologic: No hives or eczema    On Neurological Examination:    Mental Status - Patient is alert, awake, oriented X3.     Follows commands well and able to answer questions appropriately. Mood and affect  normal  Follow simple commands  Follow complex commands      Speech -       Dysarthria                            Cranial Nerves - Pupils 3 mm equal and reactive to light,   extraocular eye movements intact.   Left Facial weakness ,Frontalis as well as left orbicularis occuli and oris     Motor Exam -   Right upper5/5  Left upper5/5  Right lower5/5  Left lower 5/5    With stimuli positive movement of all 4 extremities    Muscle tone - is normal all over. No asymmetry is seen.      Sensory    decreased pinprick of right  L2 and L3    Gait -  normal      Dtr 1+     GENERAL Exam:     Nontoxic , No Acute Distress   	  HEENT:  normocephalic, atraumatic  		  LUNGS:	Clear bilaterally  No Wheeze  Decreased bilaterally  	  HEART:	 Normal S1S2   No murmur RRR        	  GI/ ABDOMEN:  Soft  Non tender    EXTREMITIES:   No Edema  No Clubbing  No Cyanosis No Edema    MUSCULOSKELETAL: Normal Range of Motion  	   SKIN:      Normal   No Ecchymosis               LABS:  CBC Full  -  ( 08 Sep 2018 08:51 )  WBC Count : 8.2 K/uL  Hemoglobin : 14.4 g/dL  Hematocrit : 44.2 %  Platelet Count - Automated : 222 K/uL  Mean Cell Volume : 88.9 fl  Mean Cell Hemoglobin : 29.0 pg  Mean Cell Hemoglobin Concentration : 32.6 gm/dL  Auto Neutrophil # : 4.4 K/uL  Auto Lymphocyte # : 2.8 K/uL  Auto Monocyte # : 0.7 K/uL  Auto Eosinophil # : 0.2 K/uL  Auto Basophil # : 0.1 K/uL  Auto Neutrophil % : 54.2 %  Auto Lymphocyte % : 33.7 %  Auto Monocyte % : 8.8 %  Auto Eosinophil % : 2.6 %  Auto Basophil % : 0.7 %      09-08    136  |  101  |  14  ----------------------------<  141<H>  4.1   |  27  |  0.96    Ca    9.0      08 Sep 2018 08:51    TPro  7.9  /  Alb  4.0  /  TBili  0.5  /  DBili  x   /  AST  32  /  ALT  71<H>  /  AlkPhos  103  09-07    Hemoglobin A1C: Hemoglobin A1C, Whole Blood: 7.8 % (09-07 @ 22:57)    Lipid Panel 09-08 @ 08:51  Total Cholesterol, Serum 147  LDL 78  Triglycerides 128    LIVER FUNCTIONS - ( 07 Sep 2018 12:16 )  Alb: 4.0 g/dL / Pro: 7.9 g/dL / ALK PHOS: 103 U/L / ALT: 71 U/L DA / AST: 32 U/L / GGT: x           Vitamin B12   PT/INR - ( 07 Sep 2018 12:16 )   PT: 11.7 sec;   INR: 1.07 ratio         PTT - ( 07 Sep 2018 12:16 )  PTT:32.9 sec    INTERPRETATION:  CT ANGIOGRAPHY OF THE HEAD AND NECK  NONCONTRAST CT HEAD    HISTORY: R facial parasthesia.    COMPARISON: None available.    TECHNIQUE:   1. Noncontrast CT head was performed.  2. CT angiographic evaluation of the head and neck was performed   consisting of contiguous axial sections scanned from the vertex to the   level of the aortic arch following the intravenous administration of   iodinated contrast. The acquired data was post-processed to generate   3D/MIP coronal, and sagittal reformats of the head and neck arterial   vasculature. Vascular stenosis is measured by NASCET criteria comparing   the narrowest segment with the distal luminal diameter as related to the   reported measure of arterial narrowing.    FINDINGS:    CT head:  No acute intracranial hemorrhage, mass effect or midline shift.  No CT evidence of acute large territory vascular infarct.  The ventricles and cortical sulci are within normal limits.    The paranasal sinuses and mastoid air cells are well aerated.    CTA head and neck:  Bovine arch configuration. Great vessel origins are patent. Innominate   and visualized subclavian arteries are patent.    Anterior circulation:  The common carotid arteries are patent and normal in caliber. The carotid   bifurcations are unremarkable. There is a medial, retropharyngeal course   of the right cervical internal carotid artery. The internal carotid   arteries are otherwise patent and normal in caliber.     The anterior and middle cerebral arteries show normal caliber and   branching pattern.    The left posterior communicating artery is seen.    Posterior circulation:   The vertebral arteries are normal in course and caliber. The basilar   artery is unremarkable. The posterior cerebral arteries are normal in   caliber and arise from the basilar tip.     Other:  No enlarged cervical adenopathy by size criteria.    EKG

## 2018-09-08 NOTE — PHYSICAL THERAPY INITIAL EVALUATION ADULT - ADDITIONAL COMMENTS
Patient lives in a private house with 7 steps inside, patient is independent with transfers, ADLs, indoor/outdoor ambulation and stairs w/o device.

## 2018-09-08 NOTE — CONSULT NOTE ADULT - ASSESSMENT
LEFT BELLS PALSY     HOWEVER AS HE HAS DIABETES RULE OUT PONTINE INFARCT     DIABETES MELLITUS X 8 YEARS     RIGHT LATERAL FEMORAL CUTANEOUS NEUROPATHY     PLAN : CONTINUE ASA AND STATINS    WEIGHT REDUCTION ,DISCUSSED EXTENSIVELY  WITH PATIENT ,SON ,SISTER AT LENGTH.    NEURONTIN 300 MG PO Q HS FOR FEM CUTANEOS NEUROPATHY     RULE OUT CERVICAL AND LUMBAR RADICULOPATHY

## 2018-09-09 ENCOUNTER — TRANSCRIPTION ENCOUNTER (OUTPATIENT)
Age: 58
End: 2018-09-09

## 2018-09-09 LAB
ANION GAP SERPL CALC-SCNC: 8 MMOL/L — SIGNIFICANT CHANGE UP (ref 5–17)
BASOPHILS # BLD AUTO: 0 K/UL — SIGNIFICANT CHANGE UP (ref 0–0.2)
BASOPHILS NFR BLD AUTO: 0.6 % — SIGNIFICANT CHANGE UP (ref 0–2)
BUN SERPL-MCNC: 22 MG/DL — HIGH (ref 7–18)
CALCIUM SERPL-MCNC: 9.2 MG/DL — SIGNIFICANT CHANGE UP (ref 8.4–10.5)
CHLORIDE SERPL-SCNC: 104 MMOL/L — SIGNIFICANT CHANGE UP (ref 96–108)
CO2 SERPL-SCNC: 27 MMOL/L — SIGNIFICANT CHANGE UP (ref 22–31)
CREAT SERPL-MCNC: 1.06 MG/DL — SIGNIFICANT CHANGE UP (ref 0.5–1.3)
EOSINOPHIL # BLD AUTO: 0.2 K/UL — SIGNIFICANT CHANGE UP (ref 0–0.5)
EOSINOPHIL NFR BLD AUTO: 2.8 % — SIGNIFICANT CHANGE UP (ref 0–6)
GLUCOSE BLDC GLUCOMTR-MCNC: 121 MG/DL — HIGH (ref 70–99)
GLUCOSE BLDC GLUCOMTR-MCNC: 160 MG/DL — HIGH (ref 70–99)
GLUCOSE BLDC GLUCOMTR-MCNC: 191 MG/DL — HIGH (ref 70–99)
GLUCOSE BLDC GLUCOMTR-MCNC: 203 MG/DL — HIGH (ref 70–99)
GLUCOSE SERPL-MCNC: 150 MG/DL — HIGH (ref 70–99)
HCT VFR BLD CALC: 45.2 % — SIGNIFICANT CHANGE UP (ref 39–50)
HGB BLD-MCNC: 14.9 G/DL — SIGNIFICANT CHANGE UP (ref 13–17)
LYMPHOCYTES # BLD AUTO: 2.4 K/UL — SIGNIFICANT CHANGE UP (ref 1–3.3)
LYMPHOCYTES # BLD AUTO: 30 % — SIGNIFICANT CHANGE UP (ref 13–44)
MCHC RBC-ENTMCNC: 29.7 PG — SIGNIFICANT CHANGE UP (ref 27–34)
MCHC RBC-ENTMCNC: 32.9 GM/DL — SIGNIFICANT CHANGE UP (ref 32–36)
MCV RBC AUTO: 90.3 FL — SIGNIFICANT CHANGE UP (ref 80–100)
MONOCYTES # BLD AUTO: 0.7 K/UL — SIGNIFICANT CHANGE UP (ref 0–0.9)
MONOCYTES NFR BLD AUTO: 8.8 % — SIGNIFICANT CHANGE UP (ref 2–14)
NEUTROPHILS # BLD AUTO: 4.6 K/UL — SIGNIFICANT CHANGE UP (ref 1.8–7.4)
NEUTROPHILS NFR BLD AUTO: 57.7 % — SIGNIFICANT CHANGE UP (ref 43–77)
PLATELET # BLD AUTO: 200 K/UL — SIGNIFICANT CHANGE UP (ref 150–400)
POTASSIUM SERPL-MCNC: 4 MMOL/L — SIGNIFICANT CHANGE UP (ref 3.5–5.3)
POTASSIUM SERPL-SCNC: 4 MMOL/L — SIGNIFICANT CHANGE UP (ref 3.5–5.3)
RBC # BLD: 5 M/UL — SIGNIFICANT CHANGE UP (ref 4.2–5.8)
RBC # FLD: 12.5 % — SIGNIFICANT CHANGE UP (ref 10.3–14.5)
SODIUM SERPL-SCNC: 139 MMOL/L — SIGNIFICANT CHANGE UP (ref 135–145)
WBC # BLD: 8 K/UL — SIGNIFICANT CHANGE UP (ref 3.8–10.5)
WBC # FLD AUTO: 8 K/UL — SIGNIFICANT CHANGE UP (ref 3.8–10.5)

## 2018-09-09 RX ORDER — GABAPENTIN 400 MG/1
300 CAPSULE ORAL AT BEDTIME
Qty: 0 | Refills: 0 | Status: DISCONTINUED | OUTPATIENT
Start: 2018-09-09 | End: 2018-09-10

## 2018-09-09 RX ORDER — LISINOPRIL 2.5 MG/1
5 TABLET ORAL DAILY
Qty: 0 | Refills: 0 | Status: DISCONTINUED | OUTPATIENT
Start: 2018-09-09 | End: 2018-09-10

## 2018-09-09 RX ADMIN — GABAPENTIN 300 MILLIGRAM(S): 400 CAPSULE ORAL at 22:38

## 2018-09-09 RX ADMIN — CLOPIDOGREL BISULFATE 75 MILLIGRAM(S): 75 TABLET, FILM COATED ORAL at 12:38

## 2018-09-09 RX ADMIN — ATORVASTATIN CALCIUM 40 MILLIGRAM(S): 80 TABLET, FILM COATED ORAL at 22:38

## 2018-09-09 RX ADMIN — ENOXAPARIN SODIUM 40 MILLIGRAM(S): 100 INJECTION SUBCUTANEOUS at 12:40

## 2018-09-09 RX ADMIN — Medication 81 MILLIGRAM(S): at 12:38

## 2018-09-09 RX ADMIN — LISINOPRIL 5 MILLIGRAM(S): 2.5 TABLET ORAL at 12:40

## 2018-09-09 RX ADMIN — PANTOPRAZOLE SODIUM 40 MILLIGRAM(S): 20 TABLET, DELAYED RELEASE ORAL at 06:11

## 2018-09-09 RX ADMIN — Medication 2: at 12:40

## 2018-09-09 NOTE — DISCHARGE NOTE ADULT - PATIENT PORTAL LINK FT
You can access the [facility] Patient Portal, offered by [facility], by registering with the following website: [email or web address]

## 2018-09-09 NOTE — DISCHARGE NOTE ADULT - MEDICATION SUMMARY - MEDICATIONS TO STOP TAKING
I will STOP taking the medications listed below when I get home from the hospital:     mg oral tablet  -- 1 tab(s) by mouth every 6 hours, As Needed for pain  -- Do not take this drug if you are pregnant.  It is very important that you take or use this exactly as directed.  Do not skip doses or discontinue unless directed by your doctor.  May cause drowsiness or dizziness.  Obtain medical advice before taking any non-prescription drugs as some may affect the action of this medication.  Take with food or milk.    Flomax 0.4 mg oral capsule  -- 1 cap(s) by mouth once a day  -- It is very important that you take or use this exactly as directed.  Do not skip doses or discontinue unless directed by your doctor.  May cause drowsiness.  Alcohol may intensify this effect.  Use care when operating dangerous machinery.  Some non-prescription drugs may aggravate your condition.  Read all labels carefully.  If a warning appears, check with your doctor before taking.  Swallow whole.  Do not crush.  Take with food or milk.    Zofran ODT 8 mg oral tablet, disintegrating  -- 1 tab(s) by mouth every 6 hours, As Needed for nausea or vomiting    tamsulosin 0.4 mg oral capsule  -- 1 cap(s) by mouth once a day (at bedtime)   -- It is very important that you take or use this exactly as directed.  Do not skip doses or discontinue unless directed by your doctor.  May cause drowsiness.  Alcohol may intensify this effect.  Use care when operating dangerous machinery.  Some non-prescription drugs may aggravate your condition.  Read all labels carefully.  If a warning appears, check with your doctor before taking.  Swallow whole.  Do not crush.  Take with food or milk.     mg oral tablet  -- 1 tab(s) by mouth every 6 hours   -- Do not take this drug if you are pregnant.  It is very important that you take or use this exactly as directed.  Do not skip doses or discontinue unless directed by your doctor.  May cause drowsiness or dizziness.  Obtain medical advice before taking any non-prescription drugs as some may affect the action of this medication.  Take with food or milk.

## 2018-09-09 NOTE — DISCHARGE NOTE ADULT - HOSPITAL COURSE
59 yo [place] male from home, with PMHx of HTN, DM, HLD, lt. middle trigger finger  presented to ED c/o numbness over Rt. side of mouth. He states he has been having numbness and weakness over Rt. thigh since 2 weeks, and both UE since 3 weeks. He saw his PCP for the symptom and was given pain medication and asked to do some imaging but he has not done any. He also complaints of neck pain since 3 days, more on Lt. side, non radiating, 5/10, poking like pain, decreased on bending his neck to lt. side, no aggravating factor. Today morning, after waking up, he felt numbness over Rt. side of his mouth, had drooling of water when he was brushing from lt. side and had difficulty closing his Rt. eye. He also complaints of difficulty breathing when he sleeps sometimes. He had sleep study done long time ago which was normal.      ED course: Vitals: BP: 151/77  HR: 66  RR: 18  T: 98.2  SaO2: 93  Labs normal   Radiological findings- CT angio neck and head normal  EKG- NSR @ 74bpm    Given patient's improved clinical status and current hemodynamic stability, decision was made to discharge the patient.  Patient is stable for discharge per attending and is advised to follow up with PCP as outpatient  Please refer to patient's complete medical chart with documents for a full hospital course, for this is only a brief summary.

## 2018-09-09 NOTE — PROGRESS NOTE ADULT - SUBJECTIVE AND OBJECTIVE BOX
CHIEF COMPLAINT:Patient is a 58y old  Male who presents with a chief complaint of Numbness over Rt. side of mouth, drooling of water from Lt. side of mouth, difficulty to close eye.Pt appears comfortable.    	  REVIEW OF SYSTEMS:  CONSTITUTIONAL: No fever, weight loss, or fatigue  EYES: No eye pain, visual disturbances, or discharge  ENT:  No difficulty hearing, tinnitus, vertigo; No sinus or throat pain  NECK: No pain or stiffness  RESPIRATORY: No cough, wheezing, chills or hemoptysis; No Shortness of Breath  CARDIOVASCULAR: No chest pain, palpitations, passing out, dizziness, or leg swelling  GASTROINTESTINAL: No abdominal or epigastric pain. No nausea, vomiting, or hematemesis; No diarrhea or constipation. No melena or hematochezia.  GENITOURINARY: No dysuria, frequency, hematuria, or incontinence  NEUROLOGICAL: No headaches, memory loss, loss of strength, numbness, or tremors  SKIN: No itching, burning, rashes, or lesions   LYMPH Nodes: No enlarged glands  ENDOCRINE: No heat or cold intolerance; No hair loss  MUSCULOSKELETAL: No joint pain or swelling; No muscle, back, or extremity pain  PSYCHIATRIC: No depression, anxiety, mood swings, or difficulty sleeping  HEME/LYMPH: No easy bruising, or bleeding gums  ALLERGY AND IMMUNOLOGIC: No hives or eczema	      PHYSICAL EXAM:  T(C): 36.8 (09-09-18 @ 07:35), Max: 37.1 (09-08-18 @ 11:05)  HR: 64 (09-09-18 @ 07:35) (64 - 72)  BP: 133/61 (09-09-18 @ 07:35) (115/68 - 149/66)  RR: 18 (09-09-18 @ 07:35) (16 - 18)  SpO2: 97% (09-09-18 @ 07:35) (95% - 98%)  Wt(kg): --  I&O's Summary    08 Sep 2018 07:01  -  09 Sep 2018 07:00  --------------------------------------------------------  IN: 400 mL / OUT: 0 mL / NET: 400 mL    09 Sep 2018 07:01  -  09 Sep 2018 10:03  --------------------------------------------------------  IN: 200 mL / OUT: 0 mL / NET: 200 mL        Appearance: Normal	  HEENT:   Normal oral mucosa, PERRL, EOMI	  Lymphatic: No lymphadenopathy  Cardiovascular: Normal S1 S2, No JVD, No murmurs, No edema  Respiratory: Lungs clear to auscultation	  Psychiatry: A & O x 3, Mood & affect appropriate  Gastrointestinal:  Soft, Non-tender, + BS	  Skin: No rashes, No ecchymoses, No cyanosis	  Neurologic: Non-focal  Extremities: Normal range of motion, No clubbing, cyanosis or edema  Vascular: Peripheral pulses palpable 2+ bilaterally    MEDICATIONS  (STANDING):  aspirin  chewable 81 milliGRAM(s) Oral daily  atorvastatin 40 milliGRAM(s) Oral at bedtime  clopidogrel Tablet 75 milliGRAM(s) Oral daily  enoxaparin Injectable 40 milliGRAM(s) SubCutaneous daily  gabapentin 300 milliGRAM(s) Oral at bedtime  influenza   Vaccine 0.5 milliLiter(s) IntraMuscular once  insulin lispro (HumaLOG) corrective regimen sliding scale   SubCutaneous three times a day before meals  pantoprazole    Tablet 40 milliGRAM(s) Oral before breakfast      LABS:	 	    CARDIAC MARKERS:  CARDIAC MARKERS ( 07 Sep 2018 12:16 )  <0.015 ng/mL / x     / x     / x     / x                           14.9   8.0   )-----------( 200      ( 09 Sep 2018 07:54 )             45.2     09-09    139  |  104  |  22<H>  ----------------------------<  150<H>  4.0   |  27  |  1.06    Ca    9.2      09 Sep 2018 07:54    TPro  7.9  /  Alb  4.0  /  TBili  0.5  /  DBili  x   /  AST  32  /  ALT  71<H>  /  AlkPhos  103  09-07      Lipid Profile: Cholesterol 147  LDL 78  HDL 43      HgA1c: Hemoglobin A1C, Whole Blood: 7.9 % (09-08 @ 16:17)  Hemoglobin A1C, Whole Blood: 7.8 % (09-07 @ 22:57)    TSH: Thyroid Stimulating Hormone, Serum: 3.10 uU/mL (09-08 @ 08:51)      OBSERVATIONS:  Mitral Valve: Normal mitral valve. Trace mitral  regurgitation.  Aortic Root: Aortic Root: 3.8 cm.  Aortic Valve: Normal trileaflet aortic valve.  Left Atrium: Mild left atrial enlargement.  Left Ventricle: Normal Left Ventricular Systolic Function,  (EF = 55%) Normal left ventricular internal dimensions and  wall thicknesses. Grade III diastolic dysfunction.  Right Heart: Normal right atrium. Normal right ventricular  size and function. Normal tricuspid valve. There is trace  pulmonic regurgitation.  Pericardium/PleuraNormal pericardium with no pericardial  effusion.  Hemodynamic: Unable to estimate RVSP.

## 2018-09-09 NOTE — DISCHARGE NOTE ADULT - PLAN OF CARE
to rule out Cerebro vascular accident - You presented with numbness over Rt. side of mouth and drooling of water from lt. side while brushing with numbness and weakness of rt. thigh and B/L UE.  - minimal deviation of mouth to Rt. side  - CT angio neck and head normal  - cardiac enzymes were negative  - You need to continue on Aspirin and statin.  - Neuro Dr. Jerez was consulted  - Cardio Dr. Major was consulted  - MRI showed no hemorrhage or mass  - You are medically stable to be discharged from the hospital.  - You need to follow up with your Primary Care Physician in 1 week.  - You need to continue your medications regularly as prescribed. - You presented with complaints of difficulty breathing while sleeping  - You need to follow up with your Primary Care Physician in 1 week for an outpatient sleep study. - You need to continue on diclofenac and meloxicam - You need to continue on metformin 1000mg bid and repaglinide 2mg bid at home - You have history of hypertension  - You need to continue on lisinopril 5mg - You need to continue on  atorvastatin 40 mg

## 2018-09-09 NOTE — PROGRESS NOTE ADULT - SUBJECTIVE AND OBJECTIVE BOX
Neurology Follow up note    Name  ANTELMO RECINOS    HPI:  59 yo [place] male from home, with PMHx of HTN, DM, HLD, lt. middle trigger finger  presented to ED c/o numbness over Rt. side of mouth. He states he has been having numbness and weakness over Rt. thigh since 2 weeks, and both UE since 3 weeks. He saw his PCP for the symptom and was given pain medication and asked to do some imaging but he has not done any. He also complaints of neck pain since 3 days, more on Lt. side, non radiating, 5/10, poking like pain, decreased on bending his neck to lt. side, no aggravating factor. Today morning, after waking up, he felt numbness over Rt. side of his mouth, had drooling of water when he was brushing from lt. side and had difficulty closing his Rt. eye. He also complaints of difficulty breathing when he sleeps sometimes. He had sleep study done long time ago which was normal.      ED course: Vitals: BP: 151/77  HR: 66  RR: 18  T: 98.2  SaO2: 93  Labs normal   Radiological findings- CT angio neck and head normal  EKG- NSR @ 74bpm (07 Sep 2018 18:48)      Interval History -        REVIEW OF SYSTEMS:    Constitutional: No fever, weight loss or fatigue  Eyes: No eye pain, visual disturbances, or discharge  ENMT:  No difficulty hearing, tinnitus, vertigo; No sinus or throat pain  Neck: No pain or stiffness  Respiratory: No cough, wheezing, chills or hemoptysis  Cardiovascular: No chest pain, palpitations, shortness of breath, dizziness or leg swelling  Gastrointestinal: No abdominal or epigastric pain. No nausea, vomiting or hematemesis; No diarrhea or constipation. No melena or hematochezia.  Psychiatric: No depression, anxiety, mood swings or difficulty sleeping  Heme/Lymph: No easy bruising or bleeding gums  Allergy and Immunologic: No hives or eczema    Vital Signs Last 24 Hrs  T(C): 36.9 (09 Sep 2018 11:08), Max: 37.1 (08 Sep 2018 23:20)  T(F): 98.5 (09 Sep 2018 11:08), Max: 98.8 (08 Sep 2018 23:20)  HR: 60 (09 Sep 2018 11:08) (60 - 72)  BP: 137/67 (09 Sep 2018 11:08) (115/68 - 139/75)  BP(mean): --  RR: 18 (09 Sep 2018 11:08) (16 - 18)  SpO2: 98% (09 Sep 2018 11:08) (95% - 98%)    PHYSICAL EXAM:    General: Well developed; well nourished; in no acute distress  Head: Normocephalic; atraumatic  ENMT: No nasal discharge; airway clear  Neck: Supple; non tender; no masses    Respiratory: No wheezes, rales or rhonchi  Cardiovascular: Regular rate and rhythm. S1 and S2 Normal; No murmurs, gallops or rubs  Gastrointestinal: Soft non-tender non-distended; Normal bowel sounds; No hepatosplenomegaly    Vascular: Peripheral pulses palpable 2+ bilaterally  Skin: Warm and dry. No acute rash  Lymph Nodes: No acute cervical adenopathy  Psychiatric: Cooperative and appropriate      Neurological Exam:   On Neurological Examination:    Mental Status - Patient is alert, awake, oriented X3.     Follows commands well and able to answer questions appropriately. Mood and affect  normal  Follow simple commands      Speech -       Dysarthria                            Cranial Nerves - Pupils 3 mm equal and reactive to light,   extraocular eye movements intact.   Left Facial weakness ,Frontalis as well as left orbicularis occuli and oris       Motor Exam -   Right upper 5/5  Left upper 5/5  Right lower5/5  Left lower 5/5    With stimuli positive movement of all 4 extremities    Muscle tone - is normal all over. No asymmetry is seen.      Sensory    Bilateral intact to light touch    Gait -  normal      GENERAL Exam:     Nontoxic , No Acute Distress   	  HEENT:  normocephalic, atraumatic  		  LUNGS:	Clear bilaterally  No Wheeze  Decreased bilaterally  	  HEART:	 Normal S1S2   No murmur RRR        	  GI/ ABDOMEN:  Soft  Non tender    EXTREMITIES:   No Edema  No Clubbing  No Cyanosis No Edema    MUSCULOSKELETAL: Normal Range of Motion  	   SKIN:      Normal   No Ecchymosis       Radiology MRI pending     Assessment-  Assessment and Recommendation:   · Assessment	  LEFT BELLS PALSY     HOWEVER AS HE HAS DIABETES RULE OUT PONTINE INFARCT     DIABETES MELLITUS X 8 YEARS     RIGHT LATERAL FEMORAL CUTANEOUS NEUROPATHY     PLAN : CONTINUE ASA AND STATINS    WEIGHT REDUCTION ,DISCUSSED EXTENSIVELY  WITH PATIENT ,SON ,SISTER AT LENGTH.    NEURONTIN 300 MG PO Q HS FOR FEM CUTANEOS NEUROPATHY ,no side effects     RULE OUT CERVICAL AND LUMBAR RADICULOPATHY     Plan start on steroids ,prednisone 40 mg ,as sugars are high please follow the blood sugars     discussed with the family

## 2018-09-09 NOTE — PROGRESS NOTE ADULT - ASSESSMENT
58 yr old Brazilian male from home, with PMHx of HTN, DM, HLD, lt. middle trigger finger  presented to ED c/o numbness over Rt. side of mouth. He states he has been having numbness and weakness over Rt. thigh since 2 weeks, and both UE since 3 weeks.   1.Tele monitoring.  2.Await MRI.  3.Neurology f/u.  4.DM-Insulin  5.HTN-resume low dose ace.  6.Lipid D/O-statin.  7.GI and DVT prophylaxis.

## 2018-09-09 NOTE — DISCHARGE NOTE ADULT - SECONDARY DIAGNOSIS.
PAT (obstructive sleep apnea) Trigger finger, left middle finger DM (diabetes mellitus) HTN (hypertension) Hyperlipidemia

## 2018-09-09 NOTE — PROGRESS NOTE ADULT - SUBJECTIVE AND OBJECTIVE BOX
Patient is a 58y old  Male who presents with a chief complaint of Numbness over Rt. side of mouth, drooling of water from Lt. side of mouth, difficulty to close eye (08 Sep 2018 13:03)      pt seen in tele [x  ], reg med floor [   ], bed [ x ], chair at bedside [   ], a+o x3 [x  ], lethargic [  ],  nad [ x ]      Allergies    No Known Allergies        Vitals    T(F): 98.2 (09-09-18 @ 07:35), Max: 98.8 (09-08-18 @ 23:20)  HR: 64 (09-09-18 @ 07:35) (64 - 72)  BP: 133/61 (09-09-18 @ 07:35) (115/68 - 149/66)  RR: 18 (09-09-18 @ 07:35) (16 - 18)  SpO2: 97% (09-09-18 @ 07:35) (95% - 98%)  Wt(kg): --  CAPILLARY BLOOD GLUCOSE      POCT Blood Glucose.: 130 mg/dL (08 Sep 2018 21:34)      Labs                          14.9   8.0   )-----------( 200      ( 09 Sep 2018 07:54 )             45.2       09-09    139  |  104  |  22<H>  ----------------------------<  150<H>  4.0   |  27  |  1.06    Ca    9.2      09 Sep 2018 07:54    TPro  7.9  /  Alb  4.0  /  TBili  0.5  /  DBili  x   /  AST  32  /  ALT  71<H>  /  AlkPhos  103  09-07      CARDIAC MARKERS ( 07 Sep 2018 12:16 )  <0.015 ng/mL / x     / x     / x     / x        < from: Transthoracic Echocardiogram (09.08.18 @ 06:43) >  CONCLUSIONS:  1. Normalmitral valve. Trace mitral regurgitation.  2. Normal trileaflet aortic valve.  3. Aortic Root: 3.8 cm.    4. Mild left atrial enlargement.  5. Normal left ventricular internal dimensions and wall  thicknesses.  6. Normal Left Ventricular Systolic Function,  (EF = 55%)  7. Grade III diastolic dysfunction.  8. Normal right atrium.  9. Normal right ventricular size and function.  10. Unable to estimate RVSP.  11. Normal tricuspid valve.  12. There is trace pulmonic regurgitation.  13. Normal pericardium with no pericardial effusion.    < end of copied text >        Radiology Results        Meds    MEDICATIONS  (STANDING):  aspirin  chewable 81 milliGRAM(s) Oral daily  atorvastatin 40 milliGRAM(s) Oral at bedtime  clopidogrel Tablet 75 milliGRAM(s) Oral daily  enoxaparin Injectable 40 milliGRAM(s) SubCutaneous daily  influenza   Vaccine 0.5 milliLiter(s) IntraMuscular once  insulin lispro (HumaLOG) corrective regimen sliding scale   SubCutaneous three times a day before meals  pantoprazole    Tablet 40 milliGRAM(s) Oral before breakfast      MEDICATIONS  (PRN):      Physical Exam    Neuro :  left facial droop  Respiratory: CTA B/L  CV: RRR, S1S2, no murmurs,   Abdominal: Soft, NT, ND +BS,  Extremities: No edema, + peripheral pulses    ASSESSMENT    left Facial weakness 2nd to left bells palsy   r/o pontine infarct  r/o cervical and lumbar radiculopathy  right femoral cutaneous neuropathy  h/o Hyperlipidemia  HTN (hypertension)  DM (diabetes mellitus)  Renal stones  H/O hernia repair        PLAN      cont tele,   cont aspirin, statin,   neuro f/u  weight reduction   start neurontin 300mg qhs for femoral cutaneous neuropathy  f/u mri  ct head, cta head and neck neg for acute patho  ce q8 x 1 neg noted above  cardiof/u  restart bp meds  echo EF = 55% Grade III diastolic dysfunction noted above.  physical tx   cont current meds

## 2018-09-09 NOTE — DISCHARGE NOTE ADULT - CARE PLAN
Principal Discharge DX:	Facial droop  Goal:	to rule out Cerebro vascular accident  Assessment and plan of treatment:	- You presented with numbness over Rt. side of mouth and drooling of water from lt. side while brushing with numbness and weakness of rt. thigh and B/L UE.  - minimal deviation of mouth to Rt. side  - CT angio neck and head normal  - cardiac enzymes were negative  - You need to continue on Aspirin and statin.  - Neuro Dr. Jerez was consulted  - Cardio Dr. Major was consulted  - MRI showed no hemorrhage or mass  - You are medically stable to be discharged from the hospital.  - You need to follow up with your Primary Care Physician in 1 week.  - You need to continue your medications regularly as prescribed.  Secondary Diagnosis:	PAT (obstructive sleep apnea)  Assessment and plan of treatment:	- You presented with complaints of difficulty breathing while sleeping  - You need to follow up with your Primary Care Physician in 1 week for an outpatient sleep study.  Secondary Diagnosis:	Trigger finger, left middle finger  Assessment and plan of treatment:	- You need to continue on diclofenac and meloxicam  Secondary Diagnosis:	DM (diabetes mellitus)  Assessment and plan of treatment:	- You need to continue on metformin 1000mg bid and repaglinide 2mg bid at home  Secondary Diagnosis:	HTN (hypertension)  Assessment and plan of treatment:	- You have history of hypertension  - You need to continue on lisinopril 5mg  Secondary Diagnosis:	Hyperlipidemia  Assessment and plan of treatment:	- You need to continue on  atorvastatin 40 mg

## 2018-09-09 NOTE — DISCHARGE NOTE ADULT - MEDICATION SUMMARY - MEDICATIONS TO TAKE
I will START or STAY ON the medications listed below when I get home from the hospital:    predniSONE 20 mg oral tablet  -- 1 tab(s) by mouth once a day x 46 days  -- Indication: For Facial droop    prednisoLONE 5 mg oral tablet  -- 1 tab(s) by mouth once a day  -- Indication: For Facial droop    diclofenac sodium 100 mg oral tablet, extended release  -- 1 tab(s) by mouth once a day  -- Indication: For Trigger finger, left middle finger    meloxicam 7.5 mg oral tablet  -- 1 tab(s) by mouth once a day  -- Indication: For Trigger finger, left middle finger    aspirin 81 mg oral tablet, chewable  -- 1 tab(s) by mouth once a day  -- Indication: For Trigger finger, left middle finger    lisinopril 5 mg oral tablet  -- 1 tab(s) by mouth once a day  -- Indication: For HTN (hypertension)    Neurontin 300 mg oral capsule  -- 1 cap(s) by mouth once a day (at bedtime)  -- Indication: For CVA (cerebral vascular accident)    repaglinide 2 mg oral tablet  -- 2 milligram(s) by mouth 2 times a day  -- Indication: For DM (diabetes mellitus)    metFORMIN 1000 mg oral tablet  -- 1 tab(s) by mouth 2 times a day  -- Indication: For DM (diabetes mellitus)    atorvastatin 40 mg oral tablet  -- 1 tab(s) by mouth once a day (at bedtime)  -- Indication: For CVA (cerebral vascular accident)    pantoprazole 40 mg oral delayed release tablet  -- 1 tab(s) by mouth once a day  -- Indication: For GERD

## 2018-09-10 VITALS
RESPIRATION RATE: 18 BRPM | SYSTOLIC BLOOD PRESSURE: 103 MMHG | HEART RATE: 82 BPM | TEMPERATURE: 98 F | DIASTOLIC BLOOD PRESSURE: 55 MMHG | OXYGEN SATURATION: 97 %

## 2018-09-10 LAB
GLUCOSE BLDC GLUCOMTR-MCNC: 109 MG/DL — HIGH (ref 70–99)
GLUCOSE BLDC GLUCOMTR-MCNC: 143 MG/DL — HIGH (ref 70–99)
GLUCOSE BLDC GLUCOMTR-MCNC: 146 MG/DL — HIGH (ref 70–99)
GLUCOSE BLDC GLUCOMTR-MCNC: 146 MG/DL — HIGH (ref 70–99)
GLUCOSE BLDC GLUCOMTR-MCNC: 162 MG/DL — HIGH (ref 70–99)
GLUCOSE BLDC GLUCOMTR-MCNC: 228 MG/DL — HIGH (ref 70–99)

## 2018-09-10 PROCEDURE — 82962 GLUCOSE BLOOD TEST: CPT

## 2018-09-10 PROCEDURE — 99285 EMERGENCY DEPT VISIT HI MDM: CPT | Mod: 25

## 2018-09-10 PROCEDURE — 70551 MRI BRAIN STEM W/O DYE: CPT

## 2018-09-10 PROCEDURE — 70498 CT ANGIOGRAPHY NECK: CPT

## 2018-09-10 PROCEDURE — G0378: CPT

## 2018-09-10 PROCEDURE — 93005 ELECTROCARDIOGRAM TRACING: CPT

## 2018-09-10 PROCEDURE — 70496 CT ANGIOGRAPHY HEAD: CPT

## 2018-09-10 PROCEDURE — 85730 THROMBOPLASTIN TIME PARTIAL: CPT

## 2018-09-10 PROCEDURE — 85027 COMPLETE CBC AUTOMATED: CPT

## 2018-09-10 PROCEDURE — 93306 TTE W/DOPPLER COMPLETE: CPT

## 2018-09-10 PROCEDURE — 84443 ASSAY THYROID STIM HORMONE: CPT

## 2018-09-10 PROCEDURE — 97161 PT EVAL LOW COMPLEX 20 MIN: CPT

## 2018-09-10 PROCEDURE — 80053 COMPREHEN METABOLIC PANEL: CPT

## 2018-09-10 PROCEDURE — 82607 VITAMIN B-12: CPT

## 2018-09-10 PROCEDURE — 70551 MRI BRAIN STEM W/O DYE: CPT | Mod: 26

## 2018-09-10 PROCEDURE — 84484 ASSAY OF TROPONIN QUANT: CPT

## 2018-09-10 PROCEDURE — 85610 PROTHROMBIN TIME: CPT

## 2018-09-10 PROCEDURE — 80061 LIPID PANEL: CPT

## 2018-09-10 PROCEDURE — 82746 ASSAY OF FOLIC ACID SERUM: CPT

## 2018-09-10 PROCEDURE — 83036 HEMOGLOBIN GLYCOSYLATED A1C: CPT

## 2018-09-10 PROCEDURE — 80048 BASIC METABOLIC PNL TOTAL CA: CPT

## 2018-09-10 RX ORDER — ATORVASTATIN CALCIUM 80 MG/1
1 TABLET, FILM COATED ORAL
Qty: 0 | Refills: 0 | COMMUNITY

## 2018-09-10 RX ORDER — PREDNISOLONE 5 MG
1 TABLET ORAL
Qty: 21 | Refills: 0 | OUTPATIENT
Start: 2018-09-10 | End: 2018-09-30

## 2018-09-10 RX ORDER — GABAPENTIN 400 MG/1
1 CAPSULE ORAL
Qty: 30 | Refills: 0 | OUTPATIENT
Start: 2018-09-10 | End: 2018-10-09

## 2018-09-10 RX ORDER — PREDNISOLONE 5 MG
1 TABLET ORAL
Qty: 21 | Refills: 0 | OUTPATIENT
Start: 2018-09-10 | End: 2018-10-21

## 2018-09-10 RX ORDER — CLOPIDOGREL BISULFATE 75 MG/1
1 TABLET, FILM COATED ORAL
Qty: 30 | Refills: 0 | OUTPATIENT
Start: 2018-09-10 | End: 2018-10-09

## 2018-09-10 RX ORDER — ASPIRIN/CALCIUM CARB/MAGNESIUM 324 MG
1 TABLET ORAL
Qty: 30 | Refills: 0 | OUTPATIENT
Start: 2018-09-10 | End: 2018-10-09

## 2018-09-10 RX ORDER — ATORVASTATIN CALCIUM 80 MG/1
1 TABLET, FILM COATED ORAL
Qty: 30 | Refills: 0 | OUTPATIENT
Start: 2018-09-10 | End: 2018-10-09

## 2018-09-10 RX ADMIN — Medication 2: at 12:12

## 2018-09-10 RX ADMIN — Medication 40 MILLIGRAM(S): at 13:41

## 2018-09-10 RX ADMIN — Medication 1: at 17:13

## 2018-09-10 RX ADMIN — PANTOPRAZOLE SODIUM 40 MILLIGRAM(S): 20 TABLET, DELAYED RELEASE ORAL at 05:48

## 2018-09-10 RX ADMIN — CLOPIDOGREL BISULFATE 75 MILLIGRAM(S): 75 TABLET, FILM COATED ORAL at 12:13

## 2018-09-10 RX ADMIN — Medication 81 MILLIGRAM(S): at 12:13

## 2018-09-10 RX ADMIN — ENOXAPARIN SODIUM 40 MILLIGRAM(S): 100 INJECTION SUBCUTANEOUS at 12:13

## 2018-09-10 RX ADMIN — LISINOPRIL 5 MILLIGRAM(S): 2.5 TABLET ORAL at 05:48

## 2018-09-10 NOTE — PROGRESS NOTE ADULT - SUBJECTIVE AND OBJECTIVE BOX
Patient is a 58y old  Male who presents with a chief complaint of Numbness over Rt. side of mouth, drooling of water from Lt. side of mouth, difficulty to close eye (10 Sep 2018 08:21)    pt seen in tele [x  ], reg med floor [   ], bed [ x ], chair at bedside [   ], a+o x3 [x  ], lethargic [  ],  nad [ x ]        Allergies    No Known Allergies        Vitals    T(F): 98.1 (09-10-18 @ 07:30), Max: 98.5 (09-09-18 @ 11:08)  HR: 68 (09-10-18 @ 07:30) (60 - 74)  BP: 123/77 (09-10-18 @ 07:30) (119/64 - 137/67)  RR: 18 (09-10-18 @ 07:30) (17 - 18)  SpO2: 98% (09-10-18 @ 07:30) (97% - 100%)  Wt(kg): --  CAPILLARY BLOOD GLUCOSE      POCT Blood Glucose.: 146 mg/dL (10 Sep 2018 08:25)      Labs                          14.9   8.0   )-----------( 200      ( 09 Sep 2018 07:54 )             45.2       09-09    139  |  104  |  22<H>  ----------------------------<  150<H>  4.0   |  27  |  1.06    Ca    9.2      09 Sep 2018 07:54                  Radiology Results      Meds    MEDICATIONS  (STANDING):  aspirin  chewable 81 milliGRAM(s) Oral daily  atorvastatin 40 milliGRAM(s) Oral at bedtime  clopidogrel Tablet 75 milliGRAM(s) Oral daily  enoxaparin Injectable 40 milliGRAM(s) SubCutaneous daily  gabapentin 300 milliGRAM(s) Oral at bedtime  influenza   Vaccine 0.5 milliLiter(s) IntraMuscular once  insulin lispro (HumaLOG) corrective regimen sliding scale   SubCutaneous three times a day before meals  lisinopril 5 milliGRAM(s) Oral daily  pantoprazole    Tablet 40 milliGRAM(s) Oral before breakfast  predniSONE   Tablet 40 milliGRAM(s) Oral daily      MEDICATIONS  (PRN):      Physical Exam    Neuro :  left facial droop  Respiratory: CTA B/L  CV: RRR, S1S2, no murmurs,   Abdominal: Soft, NT, ND +BS,  Extremities: No edema, + peripheral pulses    ASSESSMENT    left Facial weakness 2nd to left bells palsy   r/o pontine infarct  r/o cervical and lumbar radiculopathy  right femoral cutaneous neuropathy  h/o Hyperlipidemia  HTN (hypertension)  DM (diabetes mellitus)  Renal stones  H/O hernia repair        PLAN      cont tele,   cont aspirin, statin,   neuro f/u  weight reduction   cont neurontin 300mg qhs for femoral cutaneous neuropathy  f/u mri  ct head, cta head and neck neg for acute patho  ce q8 x 1 neg noted   cardiof/u  restart bp meds  echo EF = 55% Grade III diastolic dysfunction noted.  physical tx   cont current meds

## 2018-09-10 NOTE — PROGRESS NOTE ADULT - SUBJECTIVE AND OBJECTIVE BOX
CHIEF COMPLAINT:Patient is a 58y old  Male who presents with a chief complaint of Numbness over Rt. side of mouth, drooling of water from Lt. side of mouth, difficulty to close eye.Pt appears comfortable.    	  REVIEW OF SYSTEMS:  CONSTITUTIONAL: No fever, weight loss, or fatigue  EYES: No eye pain, visual disturbances, or discharge  ENT:  No difficulty hearing, tinnitus, vertigo; No sinus or throat pain  NECK: No pain or stiffness  RESPIRATORY: No cough, wheezing, chills or hemoptysis; No Shortness of Breath  CARDIOVASCULAR: No chest pain, palpitations, passing out, dizziness, or leg swelling  GASTROINTESTINAL: No abdominal or epigastric pain. No nausea, vomiting, or hematemesis; No diarrhea or constipation. No melena or hematochezia.  GENITOURINARY: No dysuria, frequency, hematuria, or incontinence  NEUROLOGICAL: No headaches, memory loss, loss of strength, numbness, or tremors  SKIN: No itching, burning, rashes, or lesions   LYMPH Nodes: No enlarged glands  ENDOCRINE: No heat or cold intolerance; No hair loss  MUSCULOSKELETAL: No joint pain or swelling; No muscle, back, or extremity pain  PSYCHIATRIC: No depression, anxiety, mood swings, or difficulty sleeping  HEME/LYMPH: No easy bruising, or bleeding gums  ALLERGY AND IMMUNOLOGIC: No hives or eczema	      PHYSICAL EXAM:  T(C): 36.7 (09-10-18 @ 07:30), Max: 36.9 (09-09-18 @ 11:08)  HR: 68 (09-10-18 @ 07:30) (60 - 74)  BP: 123/77 (09-10-18 @ 07:30) (119/64 - 137/67)  RR: 18 (09-10-18 @ 07:30) (17 - 18)  SpO2: 98% (09-10-18 @ 07:30) (97% - 100%)    I&O's Summary    09 Sep 2018 07:01  -  10 Sep 2018 07:00  --------------------------------------------------------  IN: 850 mL / OUT: 0 mL / NET: 850 mL        Appearance: Normal	  HEENT:   Normal oral mucosa, PERRL, EOMI	  Lymphatic: No lymphadenopathy  Cardiovascular: Normal S1 S2, No JVD, No murmurs, No edema  Respiratory: Lungs clear to auscultation	  Psychiatry: A & O x 3, Mood & affect appropriate  Gastrointestinal:  Soft, Non-tender, + BS	  Skin: No rashes, No ecchymoses, No cyanosis	  Extremities: Normal range of motion, No clubbing, cyanosis or edema  Vascular: Peripheral pulses palpable 2+ bilaterally    MEDICATIONS  (STANDING):  aspirin  chewable 81 milliGRAM(s) Oral daily  atorvastatin 40 milliGRAM(s) Oral at bedtime  clopidogrel Tablet 75 milliGRAM(s) Oral daily  enoxaparin Injectable 40 milliGRAM(s) SubCutaneous daily  gabapentin 300 milliGRAM(s) Oral at bedtime  influenza   Vaccine 0.5 milliLiter(s) IntraMuscular once  insulin lispro (HumaLOG) corrective regimen sliding scale   SubCutaneous three times a day before meals  lisinopril 5 milliGRAM(s) Oral daily  pantoprazole    Tablet 40 milliGRAM(s) Oral before breakfast  predniSONE   Tablet 40 milliGRAM(s) Oral daily      	  LABS:	 	                        14.9   8.0   )-----------( 200      ( 09 Sep 2018 07:54 )             45.2     09-09    139  |  104  |  22<H>  ----------------------------<  150<H>  4.0   |  27  |  1.06    Ca    9.2      09 Sep 2018 07:54      proBNP:   Lipid Profile: Cholesterol 147  LDL 78  HDL 43      HgA1c: Hemoglobin A1C, Whole Blood: 7.9 % (09-08 @ 16:17)  Hemoglobin A1C, Whole Blood: 7.8 % (09-07 @ 22:57)    TSH: Thyroid Stimulating Hormone, Serum: 3.10 uU/mL (09-08 @ 08:51)

## 2018-09-10 NOTE — PROGRESS NOTE ADULT - PROBLEM SELECTOR PLAN 1
- p/w numbness over Rt. side of mouth and drooling of water from lt. side while brushing with numbness and weakness of rt. thigh and B/L UE.  - NIHSS 1, minimal deviation of mouth to Rt. side  - risk factor- HLD, HTN, DM, former smoker  - CT angio neck and head normal  - EKG shows NSR @ 74bpm  - trop X 1 negative  - on asa, plavix and statin.  - on tele  - on mechanical soft diet  - PT and speech and swallow consulted  - FU ECHO, HbA1C, lipid profile, TSH, Vitamin B12, folic acid  -Neuro Dr. Jerez  - Cardio Dr. Major  - FU MRI - p/w numbness over Rt. side of mouth and drooling of water from lt. side while brushing with numbness and weakness of rt. thigh and B/L UE.  - NIHSS 1, minimal deviation of mouth to Rt. side  - CT angio neck and head normal  - EKG shows NSR @ 74bpm  - trop X 3 negative  - on asa, plavix and statin.  - on tele  - Neuro Dr. Jerez  - Cardio Dr. Major  -  MRI

## 2018-09-10 NOTE — PROGRESS NOTE ADULT - PROBLEM SELECTOR PLAN 2
- complaints of difficulty breathing while sleeping  - monitor SO2  - will benefit from outpt. sleep study - complaints of difficulty breathing while sleeping  - monitor SO2  - outpt. sleep study

## 2018-09-10 NOTE — PROGRESS NOTE ADULT - ASSESSMENT
57 yo [place] male from home, with PMHx of HTN, DM, HLD, lt. middle trigger finger presented to ED c/o numbness over Rt. side of mouth, numbness and weakness over Rt. thigh since 2 weeks, and both UE since 3 weeks, neck pain since 3 days, drooling of water from mouth and difficulty closing his Rt. eye.   In ED, vitals were stable.  CT angio head and neck normal.  Admitted to tele to R/O CVA.

## 2018-09-10 NOTE — PROGRESS NOTE ADULT - REASON FOR ADMISSION
Numbness over Rt. side of mouth, drooling of water from Lt. side of mouth, difficulty to close eye

## 2018-09-10 NOTE — PROGRESS NOTE ADULT - PROBLEM SELECTOR PLAN 5
- on lisinopril 5mg OD at home  - holding for permissive HTN - on lisinopril 5mg OD at home  - c/w lisinpril

## 2018-09-10 NOTE — PROGRESS NOTE ADULT - ASSESSMENT
58 yr old [place] male from home, with PMHx of HTN, DM, HLD, lt. middle trigger finger  presented to ED c/o numbness over Rt. side of mouth. He states he has been having numbness and weakness over Rt. thigh since 2 weeks, and both UE since 3 weeks.   1.Tele monitoring.  2.Await MRI.  3.Neurology f/u.  4.DM-Insulin  5.HTN-resume low dose ace.  6.Lipid D/O-statin.  7.GI and DVT prophylaxis.

## 2018-09-10 NOTE — PROGRESS NOTE ADULT - PROBLEM SELECTOR PLAN 4
- on metformin 1000mg bid and repaglinide 2mg bid at home  - started on hss  - monitor BS  - FU HbA1C - on metformin 1000mg bid and repaglinide 2mg bid at home  - on hss  - monitor BS

## 2019-04-04 NOTE — PATIENT PROFILE ADULT. - NS PRO OT REFERRAL QUES 1 YN
Hospitalist Progress note    Patient: Mati MCFARLAND Wolfe Date: 2019   : 1941 Attending: Yoanna Hung MD   78 year old male      Chief Complaint:Generalized weakness and debility due to Acute Hospitalization in response to: Urosepsis, NSTEMI secondary to demand ischemia and acute hypoxia from episode of pulmonary edema    Subjective: patient reports that he \"is not doing well today\". He called his wife today and told her that he \"is dying, all internal organs are failing and has a feeling of impending doom.\"  He feels that his cough is worse, feeling mildly SOB, mild chest pressure but mostly with his cough. Once he did feel it in his left shoulder. He complains of \"full body pain\". He is in bed but not sleeping, conversing with his eyes shut. When asked to please open his eyes he stated that \"this makes me feel better to keep them shut.\" denies headache, visual changes, palpitations. He has no appetite. His wife is at the bedside telling him that he needs to get out of bed  But he is ignoring her. She follows me outside the room and is concerned that he is very depressed. She would like him to get help.     Problem List:   Patient Active Problem List   Diagnosis   • Essential hypertension   • Other and unspecified hyperlipidemia   • Coronary atherosclerosis of unspecified type of vessel, native or graft   • Benign localized hyperplasia of prostate without urinary obstruction and other lower urinary tract symptoms (LUTS)   • Gout, unspecified   • Thrombocytopenia, unspecified   • Diabetic neuropathy (CMS/HCC)   • Spinal stenosis, lumbar region, without neurogenic claudication   • Dermatophytosis of nail   • Pain in limb   • Other hammer toe (acquired)   • Acute cholecystitis   • Achilles bursitis or tendinitis   • Intermittent claudication (CMS/HCC)   • Postprocedural membranous urethral stricture   • Spinal stenosis of lumbar region with neurogenic claudication   • Osteoarthritis of spine with  radiculopathy, lumbar region   • Type 2 diabetes mellitus with diabetic polyneuropathy, without long-term current use of insulin (CMS/HCC)   • Angina pectoris (CMS/HCC)   • Atrial fibrillation (CMS/HCC)   • Anticoagulant long-term use - Eliquis   • Encounter for monitoring amiodarone therapy   • Snoring   • Lower urinary tract symptoms (LUTS)   • Chronic kidney disease (CKD), stage III (moderate) (CMS/HCC)   • Junctional bradycardia   • Acute renal failure superimposed on stage 3 chronic kidney disease (CMS/HCC)   • Hyperkalemia   • JOSE (obstructive sleep apnea)   • Abnormality of gait and mobility   • Acute respiratory failure with hypoxia and hypercapnia (CMS/HCC)   • Septic shock (CMS/HCC)   • Acute cystitis without hematuria   • Blood in stool   • Elevated troponin   • Urinary retention   • Critical illness myopathy       Allergies: ALLERGIES:  No Known Allergies    Medications/Infusions: Reviewed    Review of Systems: 12 pt review neg except what was discussed in HPI              Vital Last Value 24 Hour Range   Temperature 99.3 °F (37.4 °C) (04/04/19 0549) Temp  Min: 98.6 °F (37 °C)  Max: 99.3 °F (37.4 °C)   Pulse 80 (04/04/19 0923) Pulse  Min: 80  Max: 82   Respiratory 18 (04/04/19 0549) Resp  Min: 18  Max: 18   Non-Invasive  Blood Pressure 110/58 (04/04/19 0923) BP  Min: 110/58  Max: 126/68   Pulse Oximetry 94 % (04/04/19 0923) SpO2  Min: 94 %  Max: 94 %     Vital Today Admitted   Weight 105 kg (04/02/19 0633) Weight: 105 kg (04/01/19 1233)   Height N/A Height: 5' 9\" (175.3 cm) (04/01/19 1233)   BMI N/A BMI (Calculated): 34.18 (04/01/19 1233)       Intake/Output:      Intake/Output Summary (Last 24 hours) at 4/4/2019 1207  Last data filed at 4/4/2019 0855  Gross per 24 hour   Intake 620 ml   Output --   Net 620 ml       Physical Exam:   General appearance: awake, with eyes closed, not feeling well today   Head: Normocephalic, without obvious abnormality, atraumatic  Eyes: conjunctivae/sclerae normal. No  erythema, edema or exudate.  Ears: normal  external ear canals both ears  Throat: lips, oral mucosa dry , and tongue normal; teeth and gums normal  Neck: no adenopathy, no carotid bruit, no JVD, supple, symmetrical, trachea midline and thyroid not enlarged, symmetric, no tenderness/mass/nodules  Back: Back symmetric, no curvature. Range of motion normal. No costovertebral angle tenderness.  Lungs: diminished bases bilateral to auscultation, few scattered crackles posterior left mid lung   Heart: regular rate with ectopic beats   +AS II/VI murmur   Abdomen: Soft, non-tender; bowel sounds normal Extremities: extremities normal, atraumatic, no cyanosis or edema, +2 bilateral radial pulses, 2+ bilateral PT pulses, milvia stockings on, 1+ bilateral ankle edema.    Skin: sacral pressure area with sheering, less painful today, sheering healing    Neurologic: Alert and oriented x3, normal strength and tone. Normal symmetric reflexes. Normal coordination and gait  Laboratory Results:   Recent Labs   Lab 04/04/19  0540 04/03/19  0446 04/02/19  0448  03/30/19  0328 03/29/19  0344   WBC 8.5 10.6 10.5   < > 6.8 7.4   HCT 25.3* 25.6* 26.5*   < > 27.2* 27.1*   HGB 8.3* 8.5* 8.6*   < > 8.7* 8.6*    165 169   < > 166 153   INR  --   --   --   --   --  1.2   PTT  --   --   --   --   --  33*   SODIUM 137 139 138   < > 137 135   POTASSIUM 4.1 4.1 4.0   < > 4.4 4.5   CHLORIDE 107 107 106   < > 106 104   CO2 24 23 25   < > 24 24   CALCIUM 8.8 8.8 8.9   < > 8.6 8.7   GLUCOSE 157* 174* 162*   < > 211* 233*   BUN 24* 21* 21*   < > 33* 42*   CREATININE 1.23* 1.16 1.13   < > 1.33* 1.58*   AST  --   --   --   --  28  --    GPT  --   --   --   --  36  --    ALKPT  --   --   --   --  133*  --    BILIRUBIN  --   --   --   --  0.6  --    ALBUMIN  --   --   --   --  2.6*  --    GFRNA 56 60 62   < > 51 41   PHOS  --   --   --   --  2.3*  --     < > = values in this interval not displayed.   Imaging: No results found.  DVT/VTE Prophylaxis:  VTE  Pharmacologic Prophylaxis: Yes  VTE Mechanical Prophylaxis: Yes    Assessment/Plans:   1.Urosepsis, improved   urinary tract infection, Klebsiella bacteremia and bacteremia pneumoniae    -self caths at home d/t urinary obstruction     2.NSTEMI, stable   Had an elevated troponin 2/2 demand ischemia    -HFrEF, 41%, plavix, Lipitor  -pain with cough but did go to shoulder  -EKG shows new lateral ischemia      3. Hypoxic respiratory failure secondary to Acute pulmonary edema   -monitor weight daily-34.18 kg ( 35.36kg on admission)   -low sodium diet  -restart lasix 20 mg  -cough increased today   -obtain chest Xray - waiting        4. Acute blood loss anemia secondary to, Gastrointestinal Bleed   -black tarry stools for the past week  -Guaiac positive stool  -EGD with GI-NEG  - Hg stable  -follow up as outpatient with colonoscopy has been recommended      5. Hypotension, and acute blood loss anemia resulting in Acute tubular necrosis in the presence of stage III renal disease both acute and chronic   -Hg trend -> 8.1, 8.6 today 8.5   -no further episodes of hypotension   -creatinine stable 1.16, restart lasix, creatine elevated today  1.23     6. Atrial fibrillation status post cardioversion, 4/2018, (stable)   -On amiodarone, eliquis   -rhythm-> sinus rhythm with PAC    CHADSVASC=5  HASBLED=3      7. Insulin-dependent diabetes mellitus type 2 with hyperglycemia, (stable)   -sliding scale insulin,lantus 18 u nightly   -on concentrated carbohydrate diet  - HgA1c  6.1     8. Generalized weakness and debility due to critical illness, (stable)   -participating in PT/OT  -progressing      9. Insomnia, (stable)   -utilizing ambien       10. Essential Hypertension, (stable)   -continue Norvasc    11. Un-stageable pressure area, (improving)  -on coccyx   -pressure relieving dressing   -wound consult     12. Situational depression   -rehab psychologist consulted   -requested per wife       Awaiting for CXR, labs, weight consult  with psych    Has cough and feeling of overall impending doom.  Is hemodynamically stable Tmax 99.3, HR 80-82 ,   normotensive and pox 94% on RA, UA negative.   Will continue to monitor         ILANA Ricci, [facility] Hospitalist  Pager [phone]    [facility] Attending Addendum:    I have reviewed the notes and assessments performed by Annalisa Craven and made adjustments as necessary and we discussed the case and jointly formed the treatment plan above. I personally interviewed and examined the patient and agree with the documentation of the patient's care as above.    Exam findings:  Gen: A&Ox3, NAD.  Heart RRR.  Lungs CTAB.  Ext with 2+ pitting edema.  No JVD appreciated.  Mood is stable now.    Patient did not sleep well, tends to have catastrophic thinking, feels he overreacted this morning.  I do think patient is volume overloaded on exam.  Troponins were elevated earlier in admission.  Currently without chest pain.  NT proBNP slightly higher than before.  Lasix restarted today.  Will increase to 40 mg PO qd tomorrow, monitor blood pressure closely.  Asking cardiology to continue to follow patient.  Patient remains on cefpodoxime which should cover PNA.  Continue supportive care.  Psych consult to evaluate for depression/anxiety.    Pablito Hamlin DO  [facility] Hospitalist  Pager [phone]  Contact the Hospitalist caring for the patient until 7:00pm. From 7:00pm to 7:00 am contact the Hospitalist on call at [phone]                   no

## 2019-10-25 ENCOUNTER — EMERGENCY (EMERGENCY)
Facility: HOSPITAL | Age: 59
LOS: 1 days | Discharge: HOME | End: 2019-10-25
Attending: EMERGENCY MEDICINE
Payer: COMMERCIAL

## 2019-10-25 VITALS
OXYGEN SATURATION: 98 % | RESPIRATION RATE: 16 BRPM | DIASTOLIC BLOOD PRESSURE: 79 MMHG | HEART RATE: 77 BPM | SYSTOLIC BLOOD PRESSURE: 123 MMHG | TEMPERATURE: 98 F | WEIGHT: 201.94 LBS | HEIGHT: 67 IN

## 2019-10-25 DIAGNOSIS — Z98.890 OTHER SPECIFIED POSTPROCEDURAL STATES: Chronic | ICD-10-CM

## 2019-10-25 PROCEDURE — 73552 X-RAY EXAM OF FEMUR 2/>: CPT | Mod: 26,LT

## 2019-10-25 PROCEDURE — 73080 X-RAY EXAM OF ELBOW: CPT | Mod: 26,LT

## 2019-10-25 PROCEDURE — 99284 EMERGENCY DEPT VISIT MOD MDM: CPT | Mod: 25

## 2019-10-25 PROCEDURE — 73552 X-RAY EXAM OF FEMUR 2/>: CPT

## 2019-10-25 PROCEDURE — 73502 X-RAY EXAM HIP UNI 2-3 VIEWS: CPT

## 2019-10-25 PROCEDURE — 99284 EMERGENCY DEPT VISIT MOD MDM: CPT

## 2019-10-25 PROCEDURE — 73080 X-RAY EXAM OF ELBOW: CPT

## 2019-10-25 PROCEDURE — 73502 X-RAY EXAM HIP UNI 2-3 VIEWS: CPT | Mod: 26,LT

## 2019-10-25 RX ORDER — IBUPROFEN 200 MG
600 TABLET ORAL ONCE
Refills: 0 | Status: COMPLETED | OUTPATIENT
Start: 2019-10-25 | End: 2019-10-25

## 2019-10-25 RX ADMIN — Medication 600 MILLIGRAM(S): at 20:49

## 2019-10-25 NOTE — ED PROVIDER NOTE - PATIENT PORTAL LINK FT
You can access the FollowMyHealth Patient Portal offered by [facility] by registering at the following website: [email or web address]. By joining [facility]’s FollowMyHealth portal, you will also be able to view your health information using other applications (apps) compatible with our system.

## 2019-10-25 NOTE — ED PROVIDER NOTE - PHYSICAL EXAMINATION
LUE: Patient tender to left elbow.  No point tenderness, no deformity, no warmth, no swelling, no loss of strength.    LLE: Patient tender to left lateral thigh and hip.  No point tenderness, no swelling, no deformity, 4/5 strength in leg 2/2 pain. No rotation or lengthening / shortening of leg.

## 2019-10-25 NOTE — ED PROVIDER NOTE - OBJECTIVE STATEMENT
58 y/o M with PMHx of DM, HTN, HLD presents to the ED with complaints of L thigh and arm pain s/p slip and fall. Patient states he slipped on a wet floor and fell while at work yesterday. Patient states he fell on his left side and has not taken any medications for pain. Reports pain is predominately from the L knee to the upper thigh. Patient reports pain with ambulating and feeling of unsteadiness. Patient denies dizziness prior to fall. Pain is worsened with lifting L leg and walking. Denies LOC, head trauma or any other acute complaints. 60 y/o M with PMHx of DM, HTN, HLD presents to the ED with complaints of L thigh and L elbow pain s/p slip and fall. Patient states he slipped on a wet floor and fell while at work yesterday. Patient states he fell on his left side and has not taken any medications for pain. Reports pain is predominately from the L thigh to the left hip, denies knee pain. Patient reports pain with ambulating and feeling of unsteadiness 2/2 pain. Patient denies dizziness prior to fall. Pain is worsened with lifting L leg and walking. Denies LOC, head trauma or any other acute complaints.

## 2019-10-25 NOTE — ED PROVIDER NOTE - ATTENDING CONTRIBUTION TO CARE
left thigh and left hip pain s/p mechanical fall   antalgic gait   able to bear weight on left knee and hip    no rotation no shortening   xray    analgesia

## 2019-10-25 NOTE — ED PROVIDER NOTE - PROGRESS NOTE DETAILS
XR results reviewed with patient, no obvious fractures. Vital signs stable. Nontoxic and medically stable for discharge. Return precautions provided and patient understands to return to the ED for concerning or worsening signs and symptoms. Instructed to follow up with primary care physician and agreeable. Patient's questions answered.

## 2019-10-25 NOTE — ED PROVIDER NOTE - CARE PLAN
Principal Discharge DX:	Fall, initial encounter Principal Discharge DX:	Contusion of lower leg, unspecified laterality, initial encounter

## 2019-10-26 PROBLEM — E78.5 HYPERLIPIDEMIA, UNSPECIFIED: Chronic | Status: ACTIVE | Noted: 2018-09-07

## 2021-01-01 NOTE — PROGRESS NOTE ADULT - SUBJECTIVE AND OBJECTIVE BOX
PGY1 Note discussed with Supervising Resident and Primary Attending.    Patient is a 58y old  Male who presents with a chief complaint of Numbness over Rt. side of mouth, drooling of water from Lt. side of mouth, difficulty to close eye (09 Sep 2018 17:49)      INTERVAL HPI/OVERNIGHT EVENTS :    MEDICATIONS  (STANDING):  aspirin  chewable 81 milliGRAM(s) Oral daily  atorvastatin 40 milliGRAM(s) Oral at bedtime  clopidogrel Tablet 75 milliGRAM(s) Oral daily  enoxaparin Injectable 40 milliGRAM(s) SubCutaneous daily  gabapentin 300 milliGRAM(s) Oral at bedtime  influenza   Vaccine 0.5 milliLiter(s) IntraMuscular once  insulin lispro (HumaLOG) corrective regimen sliding scale   SubCutaneous three times a day before meals  lisinopril 5 milliGRAM(s) Oral daily  pantoprazole    Tablet 40 milliGRAM(s) Oral before breakfast  predniSONE   Tablet 40 milliGRAM(s) Oral daily    MEDICATIONS  (PRN):      Allergies    No Known Allergies    Intolerances        REVIEW OF SYSTEMS :  * CONSTITUTIONAL      : No Fever, Weight loss, or Fatigue  * EYES                             : No eye pain , Visual disturbances or Discharge  * RESPIRATORY             : No Cough, Wheezing, Chills or Hemoptysis; No shortness of breath  * CARDIOVASCULAR     : No Chest pain, Palpitations, Dizziness, or Leg swelling  * GASTROINTESTINAL  : No Abdominal or Epigastric pain. No Nausea, Vomiting or Hematemesis; No Diarrhea or Constipation. No Melena or Hematochezia.  * GENITOURINARY        : No Dysuria , Frequency , Haematuria   * NEUROLOGICAL          : No Headaches, Memory loss, Loss of trength, Numbness, or Tremors  * MUSCULOSKELETAL   : No Joint pain  * PSYCHIATRY                 : No Depression or Anxiety   * HEME/LYMPH              : No Easy Bruising or Bleeding gums  * SKIN                               : No Itching, Burning, Rashes, or Lesions     Vital Signs Last 24 Hrs  T(C): 36.6 (10 Sep 2018 04:47), Max: 36.9 (09 Sep 2018 11:08)  T(F): 97.9 (10 Sep 2018 04:47), Max: 98.5 (09 Sep 2018 11:08)  HR: 74 (10 Sep 2018 04:47) (60 - 74)  BP: 119/69 (10 Sep 2018 04:47) (119/64 - 137/67)  BP(mean): --  RR: 17 (10 Sep 2018 04:47) (17 - 18)  SpO2: 97% (10 Sep 2018 04:47) (97% - 100%)    PHYSICAL EXAM :  * GENERAL                 : NAD, Well-groomed, Well-developed  * HEAD                       :  Atraumatic, Normocephalic  * EYES                         : EOMI, PERRLA, Conjunctiva and Sclera clear  * ENT                           : Moist Mucous Membranes  * NECK                         : Supple, No JVD, Normal Thyroid  * CHEST/LUNG           : Clear to Auscultation bilaterally; No Rales, Rhonchi, Wheezing or Rubs  * HEART                       : Regular Rate and Rhythm; No murmurs, Rubs or gallops  * ABDOMEN                : Soft, Non-tender, Non-distended; Bowel Sounds present  * NERVOUS SYSTEM  :  Alert & Oriented X3, Good Concentration; Motor Strength 5/5 B/L UL LL ; DTRs 2+ Intact and Symmetric  * EXTREMITIES            :  2+ Peripheral Pulses, No clubbing, cyanosis, or edema  * SKIN                           : No Rashes or Lesions    LABS:                          14.9   8.0   )-----------( 200      ( 09 Sep 2018 07:54 )             45.2     09-09    139  |  104  |  22<H>  ----------------------------<  150<H>  4.0   |  27  |  1.06    Ca    9.2      09 Sep 2018 07:54          CAPILLARY BLOOD GLUCOSE      POCT Blood Glucose.: 160 mg/dL (09 Sep 2018 21:21)  POCT Blood Glucose.: 121 mg/dL (09 Sep 2018 16:33)  POCT Blood Glucose.: 203 mg/dL (09 Sep 2018 12:35)  POCT Blood Glucose.: 191 mg/dL (09 Sep 2018 11:22)      RADIOLOGY & ADDITIONAL TESTS:   No radiological imaging was required    Imaging Personally Reviewed   :  [ ] YES  [ ] NO    Consultant(s) Notes Reviewed :  [ ] YES  [ ] NO (2) well flexed PGY1 Note discussed with Supervising Resident and Primary Attending.    Patient is a 58y old  Male who presents with a chief complaint of Numbness over Rt. side of mouth, drooling of water from Lt. side of mouth, difficulty to close eye (09 Sep 2018 17:49)      INTERVAL HPI/OVERNIGHT EVENTS : No acute events reported overnight.    Pt is seen at the bedside. Pt is found resting comfortably in bed in no acute distress, reports Rt leg pain and Pain on left side of face . Patient denies nausea, vomiting, diarrhea, chest pain, SOB, headache, dizziness.     MEDICATIONS  (STANDING):  aspirin  chewable 81 milliGRAM(s) Oral daily  atorvastatin 40 milliGRAM(s) Oral at bedtime  clopidogrel Tablet 75 milliGRAM(s) Oral daily  enoxaparin Injectable 40 milliGRAM(s) SubCutaneous daily  gabapentin 300 milliGRAM(s) Oral at bedtime  influenza   Vaccine 0.5 milliLiter(s) IntraMuscular once  insulin lispro (HumaLOG) corrective regimen sliding scale   SubCutaneous three times a day before meals  lisinopril 5 milliGRAM(s) Oral daily  pantoprazole    Tablet 40 milliGRAM(s) Oral before breakfast  predniSONE   Tablet 40 milliGRAM(s) Oral daily    MEDICATIONS  (PRN):      Allergies    No Known Allergies    Intolerances        REVIEW OF SYSTEMS :  * CONSTITUTIONAL      : No Fever, Weight loss, or Fatigue  * EYES                             : No eye pain , Visual disturbances or Discharge  * RESPIRATORY             : No Cough, Wheezing, Chills or Hemoptysis; No shortness of breath  * CARDIOVASCULAR     : No Chest pain, Palpitations, Dizziness, or Leg swelling  * GASTROINTESTINAL  : No Abdominal or Epigastric pain. No Nausea, Vomiting or Hematemesis; No Diarrhea or Constipation. No Melena or Hematochezia.  * GENITOURINARY        : No Dysuria , Frequency , Haematuria   * NEUROLOGICAL          : No Headaches, Memory loss, Loss of trength, Numbness, or Tremors  * MUSCULOSKELETAL   : No Joint pain  * PSYCHIATRY                 : No Depression or Anxiety   * HEME/LYMPH              : No Easy Bruising or Bleeding gums  * SKIN                               : No Itching, Burning, Rashes, or Lesions     Vital Signs Last 24 Hrs  T(C): 36.6 (10 Sep 2018 04:47), Max: 36.9 (09 Sep 2018 11:08)  T(F): 97.9 (10 Sep 2018 04:47), Max: 98.5 (09 Sep 2018 11:08)  HR: 74 (10 Sep 2018 04:47) (60 - 74)  BP: 119/69 (10 Sep 2018 04:47) (119/64 - 137/67)  BP(mean): --  RR: 17 (10 Sep 2018 04:47) (17 - 18)  SpO2: 97% (10 Sep 2018 04:47) (97% - 100%)    PHYSICAL EXAM :  * GENERAL                 : NAD, Well-groomed, Well-developed  * HEAD                       :  Atraumatic, Normocephalic  * EYES                         : EOMI, PERRLA, Conjunctiva and Sclera clear  * ENT                           : Moist Mucous Membranes  * NECK                         : Supple, No JVD, Normal Thyroid  * CHEST/LUNG           : Clear to Auscultation bilaterally; No Rales, Rhonchi, Wheezing or Rubs  * HEART                       : Regular Rate and Rhythm; No murmurs, Rubs or gallops  * ABDOMEN                : Soft, Non-tender, Non-distended; Bowel Sounds present  * NERVOUS SYSTEM  :  Alert & Oriented X3, Good Concentration; Motor Strength 5/5 B/L UL LL ; DTRs 2+ Intact and Symmetric  * EXTREMITIES            :  2+ Peripheral Pulses, No clubbing, cyanosis, or edema  * SKIN                           : No Rashes or Lesions    LABS:                          14.9   8.0   )-----------( 200      ( 09 Sep 2018 07:54 )             45.2     09-09    139  |  104  |  22<H>  ----------------------------<  150<H>  4.0   |  27  |  1.06    Ca    9.2      09 Sep 2018 07:54          CAPILLARY BLOOD GLUCOSE      POCT Blood Glucose.: 160 mg/dL (09 Sep 2018 21:21)  POCT Blood Glucose.: 121 mg/dL (09 Sep 2018 16:33)  POCT Blood Glucose.: 203 mg/dL (09 Sep 2018 12:35)  POCT Blood Glucose.: 191 mg/dL (09 Sep 2018 11:22)      RADIOLOGY & ADDITIONAL TESTS:   No radiological imaging was required    Imaging Personally Reviewed   :  [ ] YES  [ ] NO    Consultant(s) Notes Reviewed :  [ ] YES  [ ] NO

## 2021-03-13 NOTE — ED ADULT NURSE NOTE - DISCHARGE DATE/TIME
Pharmacokinetics Service Note:    Mr. Nair continues on day 5 of vancomycin intermittent dosing to treat sepsis.  A random level was drawn this AM and resulted as 33.6 mg/L.  Based on this level there is no need to redose today. Will continue to monitor HD schedule and obtain a repeat random level tomorrow AM to guide further dosing.      Thank you,   Robert Hudson, PharmD  Pharmacy Resident  3/13/2021  14:02 EST       07-Sep-2018 19:57

## 2023-07-26 NOTE — PROGRESS NOTE ADULT - SUBJECTIVE AND OBJECTIVE BOX
59 yo Maur[place] male from home, with PMHx of HTN, DM, HLD, lt. middle trigger finger  presented to ED c/o numbness over Rt. side of mouth. He states he has been having numbness and weakness over Rt. thigh since 2 weeks, and both UE since 3 weeks. He saw his PCP for the symptom and was given pain medication and asked to do some imaging but he has not done any. He also complaints of neck pain since 3 days, more on Lt. side, non radiating, 5/10, poking like pain, decreased on bending his neck to lt. side, no aggravating factor. Today morning, after waking up, he felt numbness over Rt. side of his mouth, had drooling of water when he was brushing from lt. side and had difficulty closing his Rt. eye. He also complaints of difficulty breathing when he sleeps sometimes. He had sleep study done long time ago which was normal.      ED course: Vitals: BP: 151/77  HR: 66  RR: 18  T: 98.2  SaO2: 93  Labs normal   Radiological findings- CT angio neck and head normal  EKG- NSR @ 74bpm      Review of Systems:  · Negative General Symptoms	no fever; no chills	  · Negative Skin Symptoms	no rash; no itching; no tumor	  · Negative Ophthalmologic Symptoms	no diplopia; no photophobia; no lacrimation L; no lacrimation R; no blurred vision L; no blurred vision R; no discharge L; no discharge R	  · Negative ENMT Symptoms	no hearing difficulty; no ear pain; no tinnitus; no vertigo; no post-nasal discharge; no abnormal taste sensation; no dysphagia	  · Negative Respiratory and Thorax Symptoms	no wheezing; no dyspnea; no cough	  · Negative Cardiovascular Symptoms	no chest pain; no peripheral edema	  · Negative Gastrointestinal Symptoms	no nausea; no vomiting; no diarrhea; no constipation; no abdominal pain	  · Negative General Genitourinary Symptoms	no hematuria; no dysuria; normal urinary frequency	  · Neurological Symptoms	weakness	  · Psychiatric	negative	  · Hematology/Lymphatics	negative	  · Endocrine	negative	  · Allergic/Immunologic	negative	      pt seen in tele [x  ], reg med floor [   ], bed [ x ], chair at bedside [   ], a+o x3 [x  ], lethargic [  ],  nad [ x ]        Allergies    No Known Allergies        Vitals    T(F): 98.6 (09-08-18 @ 07:37), Max: 98.6 (09-08-18 @ 07:37)  HR: 61 (09-08-18 @ 07:37) (60 - 985)  BP: 130/65 (09-08-18 @ 07:37) (126/73 - 162/94)  RR: 18 (09-08-18 @ 07:37) (18 - 18)  SpO2: 98% (09-08-18 @ 07:37) (93% - 99%)  Wt(kg): --  CAPILLARY BLOOD GLUCOSE      POCT Blood Glucose.: 136 mg/dL (08 Sep 2018 07:30)      Labs                          13.7   7.3   )-----------( 223      ( 07 Sep 2018 12:16 )             41.6       09-07    140  |  106  |  12  ----------------------------<  85  4.1   |  24  |  1.06    Ca    9.3      07 Sep 2018 12:16    TPro  7.9  /  Alb  4.0  /  TBili  0.5  /  DBili  x   /  AST  32  /  ALT  71<H>  /  AlkPhos  103  09-07      CARDIAC MARKERS ( 07 Sep 2018 12:16 )  <0.015 ng/mL / x     / x     / x     / x                Radiology Results      Meds    MEDICATIONS  (STANDING):  aspirin  chewable 81 milliGRAM(s) Oral daily  atorvastatin 40 milliGRAM(s) Oral at bedtime  clopidogrel Tablet 75 milliGRAM(s) Oral daily  enoxaparin Injectable 40 milliGRAM(s) SubCutaneous daily  influenza   Vaccine 0.5 milliLiter(s) IntraMuscular once  insulin lispro (HumaLOG) corrective regimen sliding scale   SubCutaneous three times a day before meals  pantoprazole    Tablet 40 milliGRAM(s) Oral before breakfast      MEDICATIONS  (PRN):      Physical Exam    Neuro :  no focal deficits  Respiratory: CTA B/L  CV: RRR, S1S2, no murmurs,   Abdominal: Soft, NT, ND +BS,  Extremities: No edema, + peripheral pulses    ASSESSMENT    Facial weakness  Hyperlipidemia  HTN (hypertension)  DM (diabetes mellitus)  Renal stones  No pertinent past medical history  H/O hernia repair  No significant past surgical history  No significant past surgical history      PLAN 57 yo Vincentian male from home, with PMHx of HTN, DM, HLD, lt. middle trigger finger  presented to ED c/o numbness over Rt. side of mouth. He states he has been having numbness and weakness over Rt. thigh since 2 weeks, and both UE since 3 weeks. He saw his PCP for the symptom and was given pain medication and asked to do some imaging but he has not done any. He also complaints of neck pain since 3 days, more on Lt. side, non radiating, 5/10, poking like pain, decreased on bending his neck to lt. side, no aggravating factor. Today morning, after waking up, he felt numbness over Rt. side of his mouth, had drooling of water when he was brushing from lt. side and had difficulty closing his Rt. eye. He also complaints of difficulty breathing when he sleeps sometimes. He had sleep study done long time ago which was normal.      ED course: Vitals: BP: 151/77  HR: 66  RR: 18  T: 98.2  SaO2: 93  Labs normal   Radiological findings- CT angio neck and head normal  EKG- NSR @ 74bpm      Review of Systems:  · Negative General Symptoms	no fever; no chills	  · Negative Skin Symptoms	no rash; no itching; no tumor	  · Negative Ophthalmologic Symptoms	no diplopia; no photophobia; no lacrimation L; no lacrimation R; no blurred vision L; no blurred vision R; no discharge L; no discharge R	  · Negative ENMT Symptoms	no hearing difficulty; no ear pain; no tinnitus; no vertigo; no post-nasal discharge; no abnormal taste sensation; no dysphagia	  · Negative Respiratory and Thorax Symptoms	no wheezing; no dyspnea; no cough	  · Negative Cardiovascular Symptoms	no chest pain; no peripheral edema	  · Negative Gastrointestinal Symptoms	no nausea; no vomiting; no diarrhea; no constipation; no abdominal pain	  · Negative General Genitourinary Symptoms	no hematuria; no dysuria; normal urinary frequency	  · Neurological Symptoms	weakness	  · Psychiatric	negative	  · Hematology/Lymphatics	negative	  · Endocrine	negative	  · Allergic/Immunologic	negative	      pt seen in tele [x  ], reg med floor [   ], bed [ x ], chair at bedside [   ], a+o x3 [x  ], lethargic [  ],  nad [ x ]        Allergies    No Known Allergies        Vitals    T(F): 98.6 (09-08-18 @ 07:37), Max: 98.6 (09-08-18 @ 07:37)  HR: 61 (09-08-18 @ 07:37) (60 - 985)  BP: 130/65 (09-08-18 @ 07:37) (126/73 - 162/94)  RR: 18 (09-08-18 @ 07:37) (18 - 18)  SpO2: 98% (09-08-18 @ 07:37) (93% - 99%)  Wt(kg): --  CAPILLARY BLOOD GLUCOSE      POCT Blood Glucose.: 136 mg/dL (08 Sep 2018 07:30)      Labs                          13.7   7.3   )-----------( 223      ( 07 Sep 2018 12:16 )             41.6       09-07    140  |  106  |  12  ----------------------------<  85  4.1   |  24  |  1.06    Ca    9.3      07 Sep 2018 12:16    TPro  7.9  /  Alb  4.0  /  TBili  0.5  /  DBili  x   /  AST  32  /  ALT  71<H>  /  AlkPhos  103  09-07      CARDIAC MARKERS ( 07 Sep 2018 12:16 )  <0.015 ng/mL / x     / x     / x     / x          Radiology Results    < from: CT Angio Head w/ IV Cont (09.07.18 @ 13:36) >  FINDINGS:    CT head:  No acute intracranial hemorrhage, mass effect or midline shift.  No CT evidence of acute large territory vascular infarct.  The ventricles and cortical sulci are within normal limits.    The paranasal sinuses and mastoid air cells are well aerated.    CTA head and neck:  Bovine arch configuration. Great vessel origins are patent. Innominate   and visualized subclavian arteries are patent.    Anterior circulation:  The common carotid arteries are patent and normal in caliber. The carotid   bifurcations are unremarkable. There is a medial, retropharyngeal course   of the right cervical internal carotid artery. The internal carotid   arteries are otherwise patent and normal in caliber.     The anterior and middle cerebral arteries show normal caliber and   branching pattern.    The left posterior communicating artery is seen.    Posterior circulation:   The vertebral arteries are normal in course and caliber. The basilar   artery is unremarkable. The posterior cerebral arteries are normal in   caliber and arise from the basilar tip.     Other:  No enlarged cervical adenopathy by size criteria.    The visualized lung apices are clear.    Multilevel degenerative changes of the cervical spine are noted, worst at   C5-C6 and C6-C7.    IMPRESSION:  Patent cervical and intracranial vessels without evidence of abrupt   vessel cut off, hemodynamically significant stenosis, aneurysm, or   dissection.    Noncontrast CT head demonstrates no acute intracranial hemorrhage.      < end of copied text >          Meds    MEDICATIONS  (STANDING):  aspirin  chewable 81 milliGRAM(s) Oral daily  atorvastatin 40 milliGRAM(s) Oral at bedtime  clopidogrel Tablet 75 milliGRAM(s) Oral daily  enoxaparin Injectable 40 milliGRAM(s) SubCutaneous daily  influenza   Vaccine 0.5 milliLiter(s) IntraMuscular once  insulin lispro (HumaLOG) corrective regimen sliding scale   SubCutaneous three times a day before meals  pantoprazole    Tablet 40 milliGRAM(s) Oral before breakfast      MEDICATIONS  (PRN):      Physical Exam    Neuro :  left facial droop  Respiratory: CTA B/L  CV: RRR, S1S2, no murmurs,   Abdominal: Soft, NT, ND +BS,  Extremities: No edema, + peripheral pulses    ASSESSMENT    left Facial weakness  r/o cva  h/o Hyperlipidemia  HTN (hypertension)  DM (diabetes mellitus)  Renal stones  H/O hernia repair        PLAN      cont tele,   cont aspirin, statin,   neuro cons  f/u mri  ct head, cta head and neck neg for acute patho  ce q8 x 1 neg noted above  cardio cons  hold bp meds  f/u echo  carotid duplex  cont current meds 24-Jul-2023

## 2023-08-09 NOTE — H&P ADULT - NSHPSOURCEINFORD_GEN_ALL_CORE
Patient Patient with acute kidney injury/acute renal failure likely due to pre-renal azotemia due to dehydration KENYATTA is currently stable. Baseline creatinine unknown - Labs reviewed- Renal function/electrolytes with Estimated Creatinine Clearance: 47.1 mL/min (based on SCr of 1.2 mg/dL). according to latest data. Monitor urine output and serial BMP and adjust therapy as needed. Avoid nephrotoxins and renally dose meds for GFR listed above.

## 2024-05-30 NOTE — ED PROVIDER NOTE - NS ED ATTENDING STATEMENT MOD
n/a I have personally performed a face to face diagnostic evaluation on this patient. I have reviewed the ACP note and agree with the history, exam and plan of care, except as noted.

## 2024-08-29 NOTE — PHYSICAL THERAPY INITIAL EVALUATION ADULT - LEVEL OF INDEPENDENCE: GAIT, REHAB EVAL
Department of Anesthesiology  Preprocedure Note       Name:  Ivory Hill   Age:  51 y.o.  :  1973                                          MRN:  [id number]         Date:  2024      Surgeon: Surgeon(s):  Frankie Madsen MD    Procedure: Procedure(s):  ESOPHAGOGASTRODUODENOSCOPY    Medications prior to admission:   Prior to Admission medications    Medication Sig Start Date End Date Taking? Authorizing Provider   pantoprazole (PROTONIX) 40 MG tablet Take 1 tablet by mouth in the morning and at bedtime 10/2/18  Yes Benji Harrison MD   ursodiol (ACTIGALL) 300 MG capsule Take 1 capsule by mouth 2 times daily 24  Yes Benji Harrison MD   Multiple Vitamins-Minerals (THERAPEUTIC MULTIVITAMIN-MINERALS) tablet Take 1 tablet by mouth daily   Yes Benji Harrison MD   mycophenolate (CELLCEPT) 500 MG tablet Take 1 tablet by mouth 2 times daily   Yes Benji Harrison MD   NONFORMULARY IV TPN 12 hours at night- off of it for two weeks restarting tonight   Yes Benji Harrison MD       Current medications:    Current Facility-Administered Medications   Medication Dose Route Frequency Provider Last Rate Last Admin    lactated ringers IV soln infusion   IntraVENous Continuous Xiomara Dickson,  mL/hr at 24 1407 New Bag at 24 1407       Allergies:    Allergies   Allergen Reactions    Cephalexin Rash     Bruising. Keflex and bactrim taken together. Unsure which caused the reaction.    bruising    Sulfamethoxazole-Trimethoprim Rash           Metronidazole Rash and Other (See Comments)    Sulfa Antibiotics        Problem List:  There is no problem list on file for this patient.      Past Medical History:        Diagnosis Date    Gastroparesis     GERD (gastroesophageal reflux disease)     H/O scleroderma     Lupus (HCC)     Raynaud disease        Past Surgical History:        Procedure Laterality Date    APPENDECTOMY      ENDOSCOPY, COLON, DIAGNOSTIC      GASTRIC 
independent

## 2024-10-24 ENCOUNTER — EMERGENCY (EMERGENCY)
Facility: HOSPITAL | Age: 64
LOS: 1 days | Discharge: HOME | End: 2024-10-24
Attending: STUDENT IN AN ORGANIZED HEALTH CARE EDUCATION/TRAINING PROGRAM
Payer: MEDICAID

## 2024-10-24 VITALS
OXYGEN SATURATION: 97 % | SYSTOLIC BLOOD PRESSURE: 122 MMHG | RESPIRATION RATE: 18 BRPM | DIASTOLIC BLOOD PRESSURE: 75 MMHG | HEART RATE: 81 BPM | TEMPERATURE: 98 F

## 2024-10-24 VITALS
WEIGHT: 202.83 LBS | DIASTOLIC BLOOD PRESSURE: 82 MMHG | SYSTOLIC BLOOD PRESSURE: 143 MMHG | HEART RATE: 116 BPM | RESPIRATION RATE: 20 BRPM | TEMPERATURE: 99 F | OXYGEN SATURATION: 96 % | HEIGHT: 64 IN

## 2024-10-24 DIAGNOSIS — Z98.890 OTHER SPECIFIED POSTPROCEDURAL STATES: Chronic | ICD-10-CM

## 2024-10-24 LAB
ALBUMIN SERPL ELPH-MCNC: 3.8 G/DL — SIGNIFICANT CHANGE UP (ref 3.5–5)
ALP SERPL-CCNC: 88 U/L — SIGNIFICANT CHANGE UP (ref 40–120)
ALT FLD-CCNC: 43 U/L DA — SIGNIFICANT CHANGE UP (ref 10–60)
ANION GAP SERPL CALC-SCNC: 6 MMOL/L — SIGNIFICANT CHANGE UP (ref 5–17)
AST SERPL-CCNC: 20 U/L — SIGNIFICANT CHANGE UP (ref 10–40)
BASE EXCESS BLDV CALC-SCNC: 1.5 MMOL/L — SIGNIFICANT CHANGE UP
BASOPHILS # BLD AUTO: 0.02 K/UL — SIGNIFICANT CHANGE UP (ref 0–0.2)
BASOPHILS NFR BLD AUTO: 0.3 % — SIGNIFICANT CHANGE UP (ref 0–2)
BILIRUB SERPL-MCNC: 0.5 MG/DL — SIGNIFICANT CHANGE UP (ref 0.2–1.2)
BUN SERPL-MCNC: 17 MG/DL — SIGNIFICANT CHANGE UP (ref 7–18)
CA-I SERPL-SCNC: SIGNIFICANT CHANGE UP MMOL/L (ref 1.15–1.33)
CALCIUM SERPL-MCNC: 8.6 MG/DL — SIGNIFICANT CHANGE UP (ref 8.4–10.5)
CHLORIDE SERPL-SCNC: 105 MMOL/L — SIGNIFICANT CHANGE UP (ref 96–108)
CO2 SERPL-SCNC: 25 MMOL/L — SIGNIFICANT CHANGE UP (ref 22–31)
CREAT SERPL-MCNC: 1.39 MG/DL — HIGH (ref 0.5–1.3)
EGFR: 57 ML/MIN/1.73M2 — LOW
EOSINOPHIL # BLD AUTO: 0.05 K/UL — SIGNIFICANT CHANGE UP (ref 0–0.5)
EOSINOPHIL NFR BLD AUTO: 0.7 % — SIGNIFICANT CHANGE UP (ref 0–6)
FLUAV AG NPH QL: SIGNIFICANT CHANGE UP
FLUBV AG NPH QL: SIGNIFICANT CHANGE UP
GAS PNL BLDV: 133 MMOL/L — LOW (ref 136–145)
GAS PNL BLDV: SIGNIFICANT CHANGE UP
GLUCOSE SERPL-MCNC: 91 MG/DL — SIGNIFICANT CHANGE UP (ref 70–99)
HCO3 BLDV-SCNC: 27 MMOL/L — SIGNIFICANT CHANGE UP (ref 22–29)
HCT VFR BLD CALC: 31.4 % — LOW (ref 39–50)
HGB BLD-MCNC: 10.7 G/DL — LOW (ref 13–17)
IMM GRANULOCYTES NFR BLD AUTO: 0.4 % — SIGNIFICANT CHANGE UP (ref 0–0.9)
LACTATE BLDV-MCNC: 2.3 MMOL/L — HIGH (ref 0.5–2)
LIDOCAIN IGE QN: 35 U/L — SIGNIFICANT CHANGE UP (ref 13–75)
LYMPHOCYTES # BLD AUTO: 1.19 K/UL — SIGNIFICANT CHANGE UP (ref 1–3.3)
LYMPHOCYTES # BLD AUTO: 16.2 % — SIGNIFICANT CHANGE UP (ref 13–44)
MCHC RBC-ENTMCNC: 30.6 PG — SIGNIFICANT CHANGE UP (ref 27–34)
MCHC RBC-ENTMCNC: 34.1 GM/DL — SIGNIFICANT CHANGE UP (ref 32–36)
MCV RBC AUTO: 89.7 FL — SIGNIFICANT CHANGE UP (ref 80–100)
MONOCYTES # BLD AUTO: 0.81 K/UL — SIGNIFICANT CHANGE UP (ref 0–0.9)
MONOCYTES NFR BLD AUTO: 11 % — SIGNIFICANT CHANGE UP (ref 2–14)
NEUTROPHILS # BLD AUTO: 5.26 K/UL — SIGNIFICANT CHANGE UP (ref 1.8–7.4)
NEUTROPHILS NFR BLD AUTO: 71.4 % — SIGNIFICANT CHANGE UP (ref 43–77)
NRBC # BLD: 0 /100 WBCS — SIGNIFICANT CHANGE UP (ref 0–0)
PCO2 BLDV: 43 MMHG — SIGNIFICANT CHANGE UP (ref 42–55)
PH BLDV: 7.4 — SIGNIFICANT CHANGE UP (ref 7.32–7.43)
PLATELET # BLD AUTO: 141 K/UL — LOW (ref 150–400)
PO2 BLDV: 33 MMHG — SIGNIFICANT CHANGE UP
POTASSIUM BLDV-SCNC: 3.4 MMOL/L — LOW (ref 3.5–5.1)
POTASSIUM SERPL-MCNC: 3.5 MMOL/L — SIGNIFICANT CHANGE UP (ref 3.5–5.3)
POTASSIUM SERPL-SCNC: 3.5 MMOL/L — SIGNIFICANT CHANGE UP (ref 3.5–5.3)
PROT SERPL-MCNC: 7.6 G/DL — SIGNIFICANT CHANGE UP (ref 6–8.3)
RBC # BLD: 3.5 M/UL — LOW (ref 4.2–5.8)
RBC # FLD: 14.6 % — HIGH (ref 10.3–14.5)
RSV RNA NPH QL NAA+NON-PROBE: SIGNIFICANT CHANGE UP
SAO2 % BLDV: 55.8 % — SIGNIFICANT CHANGE UP
SARS-COV-2 RNA SPEC QL NAA+PROBE: SIGNIFICANT CHANGE UP
SODIUM SERPL-SCNC: 136 MMOL/L — SIGNIFICANT CHANGE UP (ref 135–145)
WBC # BLD: 7.36 K/UL — SIGNIFICANT CHANGE UP (ref 3.8–10.5)
WBC # FLD AUTO: 7.36 K/UL — SIGNIFICANT CHANGE UP (ref 3.8–10.5)

## 2024-10-24 PROCEDURE — 99285 EMERGENCY DEPT VISIT HI MDM: CPT

## 2024-10-24 PROCEDURE — 84295 ASSAY OF SERUM SODIUM: CPT

## 2024-10-24 PROCEDURE — 96375 TX/PRO/DX INJ NEW DRUG ADDON: CPT

## 2024-10-24 PROCEDURE — 83605 ASSAY OF LACTIC ACID: CPT

## 2024-10-24 PROCEDURE — 36415 COLL VENOUS BLD VENIPUNCTURE: CPT

## 2024-10-24 PROCEDURE — 74177 CT ABD & PELVIS W/CONTRAST: CPT | Mod: 26,MC

## 2024-10-24 PROCEDURE — 87040 BLOOD CULTURE FOR BACTERIA: CPT

## 2024-10-24 PROCEDURE — 82330 ASSAY OF CALCIUM: CPT

## 2024-10-24 PROCEDURE — 99284 EMERGENCY DEPT VISIT MOD MDM: CPT | Mod: 25

## 2024-10-24 PROCEDURE — 84132 ASSAY OF SERUM POTASSIUM: CPT

## 2024-10-24 PROCEDURE — 74177 CT ABD & PELVIS W/CONTRAST: CPT | Mod: MC

## 2024-10-24 PROCEDURE — 96374 THER/PROPH/DIAG INJ IV PUSH: CPT

## 2024-10-24 PROCEDURE — 87637 SARSCOV2&INF A&B&RSV AMP PRB: CPT

## 2024-10-24 PROCEDURE — 83690 ASSAY OF LIPASE: CPT

## 2024-10-24 PROCEDURE — 82803 BLOOD GASES ANY COMBINATION: CPT

## 2024-10-24 PROCEDURE — 85025 COMPLETE CBC W/AUTO DIFF WBC: CPT

## 2024-10-24 PROCEDURE — 80053 COMPREHEN METABOLIC PANEL: CPT

## 2024-10-24 RX ORDER — KETOROLAC TROMETHAMINE 10 MG/1
15 TABLET, FILM COATED ORAL ONCE
Refills: 0 | Status: DISCONTINUED | OUTPATIENT
Start: 2024-10-24 | End: 2024-10-24

## 2024-10-24 RX ORDER — SODIUM CHLORIDE 0.9 % (FLUSH) 0.9 %
1000 SYRINGE (ML) INJECTION ONCE
Refills: 0 | Status: COMPLETED | OUTPATIENT
Start: 2024-10-24 | End: 2024-10-24

## 2024-10-24 RX ORDER — ACETAMINOPHEN 325 MG
1000 TABLET ORAL ONCE
Refills: 0 | Status: COMPLETED | OUTPATIENT
Start: 2024-10-24 | End: 2024-10-24

## 2024-10-24 RX ORDER — ACETAMINOPHEN 325 MG
3 TABLET ORAL
Qty: 84 | Refills: 0
Start: 2024-10-24 | End: 2024-10-30

## 2024-10-24 RX ORDER — MORPHINE SULFATE 30 MG/1
4 TABLET, FILM COATED, EXTENDED RELEASE ORAL ONCE
Refills: 0 | Status: DISCONTINUED | OUTPATIENT
Start: 2024-10-24 | End: 2024-10-24

## 2024-10-24 RX ORDER — KETOROLAC TROMETHAMINE 10 MG/1
30 TABLET, FILM COATED ORAL ONCE
Refills: 0 | Status: DISCONTINUED | OUTPATIENT
Start: 2024-10-24 | End: 2024-10-24

## 2024-10-24 RX ORDER — OXYCODONE HYDROCHLORIDE 30 MG/1
1 TABLET, FILM COATED, EXTENDED RELEASE ORAL
Qty: 6 | Refills: 0
Start: 2024-10-24 | End: 2024-10-25

## 2024-10-24 RX ADMIN — KETOROLAC TROMETHAMINE 15 MILLIGRAM(S): 10 TABLET, FILM COATED ORAL at 20:07

## 2024-10-24 RX ADMIN — KETOROLAC TROMETHAMINE 15 MILLIGRAM(S): 10 TABLET, FILM COATED ORAL at 17:14

## 2024-10-24 RX ADMIN — Medication 1000 MILLILITER(S): at 16:41

## 2024-10-24 RX ADMIN — MORPHINE SULFATE 4 MILLIGRAM(S): 30 TABLET, FILM COATED, EXTENDED RELEASE ORAL at 21:17

## 2024-10-24 RX ADMIN — Medication 1000 MILLILITER(S): at 20:10

## 2024-10-24 RX ADMIN — Medication 1000 MILLIGRAM(S): at 20:07

## 2024-10-24 RX ADMIN — Medication 500 MILLIGRAM(S): at 21:15

## 2024-10-24 RX ADMIN — MORPHINE SULFATE 4 MILLIGRAM(S): 30 TABLET, FILM COATED, EXTENDED RELEASE ORAL at 20:10

## 2024-10-24 RX ADMIN — Medication 400 MILLIGRAM(S): at 16:41

## 2024-10-24 NOTE — ED PROVIDER NOTE - PHYSICAL EXAMINATION
General: Uncomfortable–appearing, no acute distress  Head: Atraumatic, normocephalic  Eyes: EOM grossly in tact, no scleral icterus, no discharge  ENT: moist mucous membranes  Neurology: A&Ox 3, 5/5 strength and normal sensation throughout Bilateral Lower extremities.  Respiratory: CTAB, no wheezing, normal respiratory effort  CV: RRR, good s1/s2, no S3, Extremities warm and well perfused  Abdominal: Soft, non-distended, Left lower quadrant tenderness to palpation  Extremities: No edema, no deformities  + SLR on L  L knee non tender to palpation, no palpable edema or effusion. no skin changes  Skin: warm and dry. No rashes

## 2024-10-24 NOTE — ED ADULT TRIAGE NOTE - CHIEF COMPLAINT QUOTE
Lower back pain radiating to LLE x 2 days, denies injuries/falls   Diarrhea x 2 days , had 4-5 Bms today, accompanied by chills

## 2024-10-24 NOTE — ED PROVIDER NOTE - CLINICAL SUMMARY MEDICAL DECISION MAKING FREE TEXT BOX
HPI obtained with help of  ID [id number], crystal  64-year-old male with a past medical history of type 2 diabetes, presenting due to concern of left-sided pain that he states shoots up from his knees to his back.  Differential diagnosis includes but is not limited to Diverticulitis, sciatica, UTI, nephrolithiasis, viral infection. will get CTAP to eval for Intra-abdominal etiology.  Will check labs, urine studies, viral swab as patient is mentioning he may have some myalgias as well.  Will treat with IV Tylenol and Toradol.  Will give IV fluid.  Patient does not have any underlying abdominal etiology would favor likely sciatica based on positive straight leg raise and description of symptoms.  Likely discharge to follow-up with her primary care doctor unless positive findings on CT or labs

## 2024-10-24 NOTE — ED ADULT TRIAGE NOTE - NS ED TRIAGE AVPU SCALE
Alert-The patient is alert, awake and responds to voice. The patient is oriented to time, place, and person. The triage nurse is able to obtain subjective information.
2

## 2024-10-24 NOTE — ED PROVIDER NOTE - SEVERE SEPSIS CRITERIA MET YN (MLM)
[FreeTextEntry1] : 5y F seen for acute visit.\par Febrile illness.\par Rapid strep neg.\par If TC positive, \par Educated re: COVID 19.\par COVID 19 nasopharyngeal specimen obtained- tolerated well.\par Pt to isolate until results received and symptoms resolve.\par Supportive care.\par RTO PRN persistent or worsening symptoms.\par Visit conducted in [place]. 
Sepsis Criteria were met:

## 2024-10-24 NOTE — ED PROVIDER NOTE - OBJECTIVE STATEMENT
HPI obtained with help of  ID [id number], George  64-year-old male with a past medical history of type 2 diabetes, presenting due to concern of left-sided pain that he states shoots up from his knees to his back.  He has had this pain for 2 days, worst was yesterday its slightly improved today.  Denies any falls or trauma.  He is also some experiencing some left lower quadrant pain and diarrhea.  He states he has normal urination, has a fever at home.  Denies cough, congestion, nausea, vomiting, chest pain, trouble breathing, sore throat, dysuria.  He has not taken anything for pain today. Pain in his knee/back is worse with moving around. No sensory or motor deficits. He has never experienced this before.

## 2024-10-24 NOTE — ED PROVIDER NOTE - NSICDXPASTMEDICALHX_GEN_ALL_CORE_FT
PAST MEDICAL HISTORY:  DM (diabetes mellitus)     HTN (hypertension)     Hyperlipidemia     Renal stones

## 2024-10-24 NOTE — ED PROVIDER NOTE - NSFOLLOWUPINSTRUCTIONS_ED_ALL_ED_FT
Jenna bridger chino con llanes médico de atención primaria en 2 a 3 días.  Regrese a la sam de emergencias por cualquier síntoma nuevo o preocupante.  Puede marguerite 975 mg de acetaminophen cada 6 horas para el dolor.  Maria T muchos líquidos y descanse. Jenna bridger chino con llanes médico de atención primaria en 2 a 3 días.  Regrese a la sam de emergencias por cualquier síntoma nuevo o preocupante.  Puede marguerite 975 mg de acetaminophen cada 6 horas para el dolor.  Maria T muchos líquidos y descanse.    La colitis es el término médico para la hinchazón (inflamación) del intestino. Puede ser causada por diferentes factores, karie bridger infección o pérdida de flujo sanguíneo en el intestino. Otras causas incluyen problemas karie la enfermedad de Crohn o la colitis ulcerosa.      Los síntomas pueden incluir diarrea que puede ser sanguinolenta, dolor abdominal o fiebre. A veces, los síntomas desaparecen sin tratamiento. Kirt es posible que necesites tratamiento, karie medicamentos, o más pruebas, karie análisis de nevaeh o un análisis de heces. También podrías necesitar pruebas de imagen karie bridger tomografía computarizada o bridger colonoscopia. En algunos casos, el médico puede querer analizar bridger muestra de tejido del intestino. Esta prueba se llama biopsia.        El seguimiento es bridger parte clave de tu tratamiento y seguridad. Asegúrate de hacer y asistir a todas las citas, y llama a tu médico o a la línea de asesoramiento de enfermería (811 en la mayoría de las provincias y territorios) si tienes problemas. También es buena idea conocer los resultados de tus pruebas y llevar bridger lista de los medicamentos que onur.      ¿Cómo puedes cuidarte en casa?      Descansa hasta que te sientas mejor. Cuando sientas ganas de comer nuevamente, comienza con pequeñas cantidades de comida. Para prevenir la deshidratación, clementina muchos líquidos. Elige agua y otros líquidos barrett hasta que te sientas mejor. Si tienes enfermedad renal, cardíaca o hepática y debes limitar los líquidos, habla con tu médico antes de aumentar la cantidad de líquidos que bebes. Sé cuidadoso con los medicamentos. Jyotsna tus medicamentos exactamente karie te los recetaron. Llama a tu médico o a la línea de asesoramiento de enfermería si crees que tienes un problema con tu medicamento. Recibirás más detalles sobre los medicamentos específicos que tu médico te recete.     ¿Cuándo deberías llamar por ayuda?      Llama al 911 si piensas que puedes necesitar atención médica de emergencia.    Por ejemplo, llama si:  Te desmayaste (perdiste el conocimiento). Tienes un dolor abdominal severo. Tus heces son marrón oscuro o muy sangrientas. Llama a tu médico o a la línea de asesoramiento de enfermería ahora o busca atención médica inmediata si:  Estás mareado o con la terence ligera, o sientes que puedes desmayarte. Tienes dificultad para respirar o estás respirando más rápido y solo pasas un poco de orina. Tienes un nuevo dolor abdominal o si ha empeorado. Tienes fiebre nueva o más young. Tienes signos de deshidratación, karie: Ojos secos y boca seca. Pasar solo un poco de orina. Sentir más sed de lo habitual. Tienes náuseas o vómitos y no puedes mantener los líquidos. No puedes evacuar heces o gases. Tienes nevaeh nueva o más nevaeh en tus heces o tus heces son negras y con aspecto de alquitrán.

## 2024-10-24 NOTE — ED ADULT NURSE NOTE - SEPSIS REFERENCE DATA CRITERIA 2
Abnormal Lactate: > 2 Spironolactone Counseling: Patient advised regarding risks of diarrhea, abdominal pain, hyperkalemia, birth defects (for female patients), liver toxicity and renal toxicity. The patient may need blood work to monitor liver and kidney function and potassium levels while on therapy. The patient verbalized understanding of the proper use and possible adverse effects of spironolactone.  All of the patient's questions and concerns were addressed.

## 2024-10-24 NOTE — ED PROVIDER NOTE - NSICDXFAMILYHX_GEN_ALL_CORE_FT
FAMILY HISTORY:  Father  Still living? Unknown  Family history of pancreatic cancer, Age at diagnosis: Age Unknown    Grandparent  Still living? Unknown  Family history of stroke, Age at diagnosis: Age Unknown

## 2024-10-25 RX ORDER — OXYCODONE HYDROCHLORIDE 30 MG/1
1 TABLET, FILM COATED, EXTENDED RELEASE ORAL
Qty: 6 | Refills: 0
Start: 2024-10-25 | End: 2024-10-26

## 2024-10-25 RX ORDER — OXYCODONE HYDROCHLORIDE 30 MG/1
1 TABLET, FILM COATED, EXTENDED RELEASE ORAL
Qty: 8 | Refills: 0
Start: 2024-10-25 | End: 2024-10-26

## 2024-10-25 RX ORDER — ACETAMINOPHEN 325 MG
3 TABLET ORAL
Qty: 84 | Refills: 0
Start: 2024-10-25 | End: 2024-10-31

## 2024-10-29 LAB
CULTURE RESULTS: SIGNIFICANT CHANGE UP
CULTURE RESULTS: SIGNIFICANT CHANGE UP
SPECIMEN SOURCE: SIGNIFICANT CHANGE UP
SPECIMEN SOURCE: SIGNIFICANT CHANGE UP
